# Patient Record
Sex: MALE | Race: ASIAN | NOT HISPANIC OR LATINO | Employment: OTHER
[De-identification: names, ages, dates, MRNs, and addresses within clinical notes are randomized per-mention and may not be internally consistent; named-entity substitution may affect disease eponyms.]

---

## 2023-01-03 LAB — HBA1C MFR BLD: 5.6 % (ref ?–5.8)

## 2023-03-26 SDOH — HEALTH STABILITY: PHYSICAL HEALTH
ON AVERAGE, HOW MANY DAYS PER WEEK DO YOU ENGAGE IN MODERATE TO STRENUOUS EXERCISE (LIKE A BRISK WALK)?: PATIENT DECLINED

## 2023-03-26 SDOH — ECONOMIC STABILITY: TRANSPORTATION INSECURITY
IN THE PAST 12 MONTHS, HAS LACK OF TRANSPORTATION KEPT YOU FROM MEETINGS, WORK, OR FROM GETTING THINGS NEEDED FOR DAILY LIVING?: PATIENT DECLINED

## 2023-03-26 SDOH — ECONOMIC STABILITY: INCOME INSECURITY: HOW HARD IS IT FOR YOU TO PAY FOR THE VERY BASICS LIKE FOOD, HOUSING, MEDICAL CARE, AND HEATING?: PATIENT DECLINED

## 2023-03-26 SDOH — ECONOMIC STABILITY: HOUSING INSECURITY
IN THE LAST 12 MONTHS, WAS THERE A TIME WHEN YOU DID NOT HAVE A STEADY PLACE TO SLEEP OR SLEPT IN A SHELTER (INCLUDING NOW)?: PATIENT REFUSED

## 2023-03-26 SDOH — ECONOMIC STABILITY: FOOD INSECURITY: WITHIN THE PAST 12 MONTHS, YOU WORRIED THAT YOUR FOOD WOULD RUN OUT BEFORE YOU GOT MONEY TO BUY MORE.: PATIENT DECLINED

## 2023-03-26 SDOH — HEALTH STABILITY: PHYSICAL HEALTH: ON AVERAGE, HOW MANY MINUTES DO YOU ENGAGE IN EXERCISE AT THIS LEVEL?: PATIENT DECLINED

## 2023-03-26 SDOH — ECONOMIC STABILITY: TRANSPORTATION INSECURITY
IN THE PAST 12 MONTHS, HAS LACK OF RELIABLE TRANSPORTATION KEPT YOU FROM MEDICAL APPOINTMENTS, MEETINGS, WORK OR FROM GETTING THINGS NEEDED FOR DAILY LIVING?: PATIENT DECLINED

## 2023-03-26 SDOH — ECONOMIC STABILITY: TRANSPORTATION INSECURITY
IN THE PAST 12 MONTHS, HAS THE LACK OF TRANSPORTATION KEPT YOU FROM MEDICAL APPOINTMENTS OR FROM GETTING MEDICATIONS?: PATIENT DECLINED

## 2023-03-26 SDOH — ECONOMIC STABILITY: INCOME INSECURITY: IN THE LAST 12 MONTHS, WAS THERE A TIME WHEN YOU WERE NOT ABLE TO PAY THE MORTGAGE OR RENT ON TIME?: PATIENT REFUSED

## 2023-03-26 SDOH — ECONOMIC STABILITY: HOUSING INSECURITY: IN THE LAST 12 MONTHS, HOW MANY PLACES HAVE YOU LIVED?: 0

## 2023-03-26 SDOH — ECONOMIC STABILITY: FOOD INSECURITY: WITHIN THE PAST 12 MONTHS, THE FOOD YOU BOUGHT JUST DIDN'T LAST AND YOU DIDN'T HAVE MONEY TO GET MORE.: PATIENT DECLINED

## 2023-03-26 SDOH — HEALTH STABILITY: MENTAL HEALTH
STRESS IS WHEN SOMEONE FEELS TENSE, NERVOUS, ANXIOUS, OR CAN'T SLEEP AT NIGHT BECAUSE THEIR MIND IS TROUBLED. HOW STRESSED ARE YOU?: PATIENT DECLINED

## 2023-03-26 ASSESSMENT — SOCIAL DETERMINANTS OF HEALTH (SDOH)
IN A TYPICAL WEEK, HOW MANY TIMES DO YOU TALK ON THE PHONE WITH FAMILY, FRIENDS, OR NEIGHBORS?: PATIENT DECLINED
DO YOU BELONG TO ANY CLUBS OR ORGANIZATIONS SUCH AS CHURCH GROUPS UNIONS, FRATERNAL OR ATHLETIC GROUPS, OR SCHOOL GROUPS?: PATIENT DECLINED
HOW OFTEN DO YOU HAVE SIX OR MORE DRINKS ON ONE OCCASION: PATIENT DECLINED
HOW OFTEN DO YOU ATTEND CHURCH OR RELIGIOUS SERVICES?: PATIENT DECLINED
DO YOU BELONG TO ANY CLUBS OR ORGANIZATIONS SUCH AS CHURCH GROUPS UNIONS, FRATERNAL OR ATHLETIC GROUPS, OR SCHOOL GROUPS?: PATIENT DECLINED
HOW OFTEN DO YOU ATTENT MEETINGS OF THE CLUB OR ORGANIZATION YOU BELONG TO?: PATIENT DECLINED
HOW OFTEN DO YOU GET TOGETHER WITH FRIENDS OR RELATIVES?: PATIENT DECLINED
ARE YOU MARRIED, WIDOWED, DIVORCED, SEPARATED, NEVER MARRIED, OR LIVING WITH A PARTNER?: PATIENT DECLINED
ARE YOU MARRIED, WIDOWED, DIVORCED, SEPARATED, NEVER MARRIED, OR LIVING WITH A PARTNER?: PATIENT DECLINED
HOW OFTEN DO YOU HAVE A DRINK CONTAINING ALCOHOL: 2-3 TIMES A WEEK
HOW OFTEN DO YOU ATTEND CHURCH OR RELIGIOUS SERVICES?: PATIENT DECLINED
WITHIN THE PAST 12 MONTHS, YOU WORRIED THAT YOUR FOOD WOULD RUN OUT BEFORE YOU GOT THE MONEY TO BUY MORE: PATIENT DECLINED
HOW MANY DRINKS CONTAINING ALCOHOL DO YOU HAVE ON A TYPICAL DAY WHEN YOU ARE DRINKING: 3 OR 4
HOW HARD IS IT FOR YOU TO PAY FOR THE VERY BASICS LIKE FOOD, HOUSING, MEDICAL CARE, AND HEATING?: PATIENT DECLINED
IN A TYPICAL WEEK, HOW MANY TIMES DO YOU TALK ON THE PHONE WITH FAMILY, FRIENDS, OR NEIGHBORS?: PATIENT DECLINED
HOW OFTEN DO YOU GET TOGETHER WITH FRIENDS OR RELATIVES?: PATIENT DECLINED
HOW OFTEN DO YOU ATTENT MEETINGS OF THE CLUB OR ORGANIZATION YOU BELONG TO?: PATIENT DECLINED

## 2023-03-26 ASSESSMENT — LIFESTYLE VARIABLES
SKIP TO QUESTIONS 9-10: 0
HOW OFTEN DO YOU HAVE SIX OR MORE DRINKS ON ONE OCCASION: PATIENT DECLINED
HOW MANY STANDARD DRINKS CONTAINING ALCOHOL DO YOU HAVE ON A TYPICAL DAY: 3 OR 4
AUDIT-C TOTAL SCORE: -1
HOW OFTEN DO YOU HAVE A DRINK CONTAINING ALCOHOL: 2-3 TIMES A WEEK

## 2023-03-28 ENCOUNTER — OFFICE VISIT (OUTPATIENT)
Dept: INTERNAL MEDICINE | Facility: OTHER | Age: 63
End: 2023-03-28
Payer: OTHER GOVERNMENT

## 2023-03-28 VITALS
DIASTOLIC BLOOD PRESSURE: 67 MMHG | TEMPERATURE: 97.1 F | OXYGEN SATURATION: 98 % | SYSTOLIC BLOOD PRESSURE: 111 MMHG | BODY MASS INDEX: 24.16 KG/M2 | HEIGHT: 65 IN | HEART RATE: 71 BPM | WEIGHT: 145 LBS

## 2023-03-28 DIAGNOSIS — F17.210 CIGARETTE NICOTINE DEPENDENCE WITHOUT COMPLICATION: ICD-10-CM

## 2023-03-28 DIAGNOSIS — Z12.2 ENCOUNTER FOR SCREENING FOR LUNG CANCER: ICD-10-CM

## 2023-03-28 DIAGNOSIS — Z23 ENCOUNTER FOR IMMUNIZATION: ICD-10-CM

## 2023-03-28 DIAGNOSIS — Z12.12 SCREENING FOR COLORECTAL CANCER: ICD-10-CM

## 2023-03-28 DIAGNOSIS — Z12.11 SCREENING FOR COLORECTAL CANCER: ICD-10-CM

## 2023-03-28 DIAGNOSIS — I10 ESSENTIAL HYPERTENSION: ICD-10-CM

## 2023-03-28 PROCEDURE — 90472 IMMUNIZATION ADMIN EACH ADD: CPT | Performed by: GENERAL PRACTICE

## 2023-03-28 PROCEDURE — 90715 TDAP VACCINE 7 YRS/> IM: CPT | Performed by: GENERAL PRACTICE

## 2023-03-28 PROCEDURE — 90471 IMMUNIZATION ADMIN: CPT | Performed by: GENERAL PRACTICE

## 2023-03-28 PROCEDURE — 90677 PCV20 VACCINE IM: CPT | Performed by: GENERAL PRACTICE

## 2023-03-28 PROCEDURE — 99204 OFFICE O/P NEW MOD 45 MIN: CPT | Mod: 25,GC | Performed by: INTERNAL MEDICINE

## 2023-03-28 RX ORDER — LISINOPRIL 40 MG/1
40 TABLET ORAL DAILY
COMMUNITY
End: 2023-03-28 | Stop reason: SDUPTHER

## 2023-03-28 RX ORDER — LISINOPRIL 40 MG/1
40 TABLET ORAL DAILY
Qty: 90 TABLET | Refills: 3 | Status: SHIPPED | OUTPATIENT
Start: 2023-03-28 | End: 2023-06-24 | Stop reason: SDUPTHER

## 2023-03-28 ASSESSMENT — ENCOUNTER SYMPTOMS
DIZZINESS: 0
MYALGIAS: 0
DIARRHEA: 0
WHEEZING: 0
COUGH: 0
SHORTNESS OF BREATH: 0
FEVER: 0
NERVOUS/ANXIOUS: 0
HEARTBURN: 0
BLURRED VISION: 0
ABDOMINAL PAIN: 0
DEPRESSION: 0
DOUBLE VISION: 0
FALLS: 0
NAUSEA: 0
CONSTIPATION: 0
WEIGHT LOSS: 0
HEADACHES: 0
SORE THROAT: 0
WEAKNESS: 0
CHILLS: 0
VOMITING: 0
PALPITATIONS: 0

## 2023-03-28 ASSESSMENT — PATIENT HEALTH QUESTIONNAIRE - PHQ9: CLINICAL INTERPRETATION OF PHQ2 SCORE: 0

## 2023-03-28 ASSESSMENT — LIFESTYLE VARIABLES: SUBSTANCE_ABUSE: 0

## 2023-03-28 NOTE — PROGRESS NOTES
New Patient to Establish    Reason to establish: New patient to establish    CC: new    HPI: 62-year-old male retired [employer]  of 27 years, presents to establish care. Retired from the [employer].    HTN: controleld at home on Lisinopril 40mg daily without issue.    No h/o CVA, heart disease, MI, PVD.    Had labs done:  T.c hol 137, Trig 72, Hdl 69, Hdl 50 in January 2023  CMP unremarkable January 2023.   A1c 5.6% and UA, CBC unremarkable January 2023.   TSH/FT4, PSA wnl January 2023.   Testesterone 410, Folic acid/B12/Vitamin D wnl.     Smokes 3/4 PPD for 30 years. Not ready to quit yet.      Screened for HIV, Hep C in the past and negative.    Colonoscopy 2012: normal    Patient Active Problem List    Diagnosis Date Noted    Essential hypertension 03/28/2023       Past Medical History:   Diagnosis Date    Hypertension     Smoker        Current Outpatient Medications   Medication Sig Dispense Refill    lisinopril (PRINIVIL) 40 MG tablet Take 1 Tablet by mouth every day for 90 days. 90 Tablet 3    aspirin EC (ECOTRIN) 81 MG Tablet Delayed Response        No current facility-administered medications for this visit.       Allergies as of 03/28/2023    (No Known Allergies)       Social History     Tobacco Use    Smoking status: Every Day     Packs/day: 0.50     Types: Cigarettes    Smokeless tobacco: Never    Tobacco comments:     Patient smokes 3 cigarettes daily   Vaping Use    Vaping Use: Never used   Substance Use Topics    Alcohol use: Yes     Comment: 10 beers weekly    Drug use: Never       Family History   Problem Relation Age of Onset    Heart Disease Mother     Hypertension Father        Past Surgical History:   Procedure Laterality Date    APPENDECTOMY      CHOLECYSTECTOMY      COMMON BILE DUCT EXPLORATION      LITHOTRIPSY           Review of Systems   Constitutional:  Negative for chills, fever, malaise/fatigue and weight loss.   HENT:  Negative for congestion and sore throat.    Eyes:   "Negative for blurred vision and double vision.   Respiratory:  Negative for cough, shortness of breath and wheezing.    Cardiovascular:  Negative for chest pain and palpitations.   Gastrointestinal:  Negative for abdominal pain, constipation, diarrhea, heartburn, nausea and vomiting.   Genitourinary:  Negative for dysuria, frequency and urgency.   Musculoskeletal:  Negative for falls, joint pain and myalgias.   Skin:  Negative for rash.   Neurological:  Negative for dizziness, weakness and headaches.   Psychiatric/Behavioral:  Negative for depression, substance abuse and suicidal ideas. The patient is not nervous/anxious.        /67 (BP Location: Right arm, Patient Position: Sitting, BP Cuff Size: Adult)   Pulse 71   Temp 36.2 °C (97.1 °F) (Temporal)   Ht 1.651 m (5' 5\")   Wt 65.8 kg (145 lb)   SpO2 98%   BMI 24.13 kg/m²       Physical Exam  Physical Exam  Vitals and nursing note reviewed.   Constitutional:       General: He is not in acute distress.     Appearance: Normal appearance. He is not ill-appearing.   HENT:      Head: Normocephalic and atraumatic.   Eyes:      Conjunctiva/sclera: Conjunctivae normal.   Cardiovascular:      Rate and Rhythm: Normal rate and regular rhythm.      Pulses: Normal pulses.      Heart sounds: Normal heart sounds. No murmur heard.    No friction rub. No gallop.   Pulmonary:      Effort: Pulmonary effort is normal.      Breath sounds: Normal breath sounds.   Musculoskeletal:      Cervical back: No tenderness.   Neurological:      General: No focal deficit present.      Mental Status: He is alert and oriented to person, place, and time.   Psychiatric:         Mood and Affect: Mood normal.         Behavior: Behavior normal.       Note: I have reviewed all pertinent labs and diagnostic tests associated with this visit with specific comments listed under the assessment and plan below    Assessment and Plan      1. Essential hypertension  Controlled. Continue Lisinopril as " prescribed  - lisinopril (PRINIVIL) 40 MG tablet; Take 1 Tablet by mouth every day for 90 days.  Dispense: 90 Tablet; Refill: 3    2. Cigarette nicotine dependence without complication  Patient smokes 3/4 PPD for 30 years.  Nicotine patch and/or gum.  I discussed cessation with patient including starting on nicotine patch and/or gum on discharge.  I also discussed medications to help with cessation with patient including Wellbutrin and Chantix, nicotine patches .  Smoking cessation discussed with patient for 4 minutes.Previous attempts to quit: none. Not ready to quit.will follow up in 7 weeks to discuss again.      3. Screening for colorectal cancer  - Referral to GI for Colonoscopy    4. Encounter for immunization  - Pneumococcal Conjugate Vaccine 20-Valent (19 yrs+)  - Tdap Vaccine =>6YO IM    5. Encounter for screening for lung cancer  - REFERRAL TO LUNG CANCER SCREENING PROGRAM          Followup: No follow-ups on file.        Signed by: Pritesh Boyd Jr., M.D.

## 2023-03-28 NOTE — PATIENT INSTRUCTIONS
-labs to do  -referral for Colonoscopy: call [phone] to schedule  -get your COVID-19 booster and Shingles vaccine, Shingrix  -referral for lung cancer screening: you will be called  -stop Aspirin since no indication to take  -follow up 7 weeks

## 2023-03-29 ENCOUNTER — TELEPHONE (OUTPATIENT)
Dept: HEMATOLOGY ONCOLOGY | Facility: MEDICAL CENTER | Age: 63
End: 2023-03-29
Payer: OTHER GOVERNMENT

## 2023-03-29 NOTE — TELEPHONE ENCOUNTER
Received referral to lung cancer screening program.  Chart review to assess for lung cancer screening program eligibility.   1. Age 50-80 yrs of age? Yes 62 y.o.  2. 20 pack year hx of smoking, or greater? Yes 0.75 [id number]= [id number] hx  3. Current smoker or if quit, has pt quit within last 15 yrs?Yes   Current smoker  4. Any signs or symptoms of lung cancer? None noted  5. Previous history of lung cancer? None noted  6. Chest CT within past 12 mos.? None noted  Patient does meet eligibility criteria. [facility] scheduling notified to schedule the shared decision making visit.

## 2023-04-06 ENCOUNTER — TELEPHONE (OUTPATIENT)
Dept: HEMATOLOGY ONCOLOGY | Facility: MEDICAL CENTER | Age: 63
End: 2023-04-06
Payer: OTHER GOVERNMENT

## 2023-04-06 NOTE — TELEPHONE ENCOUNTER
Thank you for your referral to the Lung cancer screening program. Following review we have found that your patient's insurance DOES NOT cover their Lung Cancer Screening Program Shared Decision Making Appointment. Alternative imaging exams could be considered.

## 2023-05-17 ENCOUNTER — OFFICE VISIT (OUTPATIENT)
Dept: INTERNAL MEDICINE | Facility: OTHER | Age: 63
End: 2023-05-17
Payer: OTHER GOVERNMENT

## 2023-05-17 VITALS
TEMPERATURE: 97.6 F | BODY MASS INDEX: 24.79 KG/M2 | HEART RATE: 69 BPM | WEIGHT: 148.8 LBS | HEIGHT: 65 IN | SYSTOLIC BLOOD PRESSURE: 127 MMHG | OXYGEN SATURATION: 98 % | DIASTOLIC BLOOD PRESSURE: 75 MMHG

## 2023-05-17 DIAGNOSIS — F17.210 CIGARETTE NICOTINE DEPENDENCE WITHOUT COMPLICATION: ICD-10-CM

## 2023-05-17 DIAGNOSIS — I10 ESSENTIAL HYPERTENSION: ICD-10-CM

## 2023-05-17 PROCEDURE — 99214 OFFICE O/P EST MOD 30 MIN: CPT | Mod: GC | Performed by: GENERAL PRACTICE

## 2023-05-17 PROCEDURE — 3078F DIAST BP <80 MM HG: CPT | Performed by: GENERAL PRACTICE

## 2023-05-17 PROCEDURE — 3074F SYST BP LT 130 MM HG: CPT | Performed by: GENERAL PRACTICE

## 2023-05-17 ASSESSMENT — ENCOUNTER SYMPTOMS
WHEEZING: 0
DIARRHEA: 0
HEARTBURN: 0
FALLS: 0
DOUBLE VISION: 0
SHORTNESS OF BREATH: 0
VOMITING: 0
COUGH: 0
NERVOUS/ANXIOUS: 0
WEIGHT LOSS: 0
ABDOMINAL PAIN: 0
SORE THROAT: 0
MYALGIAS: 0
DIZZINESS: 0
NAUSEA: 0
FEVER: 0
DEPRESSION: 0
CHILLS: 0
CONSTIPATION: 0
HEADACHES: 0
WEAKNESS: 0
PALPITATIONS: 0
BLURRED VISION: 0

## 2023-05-17 ASSESSMENT — LIFESTYLE VARIABLES: SUBSTANCE_ABUSE: 0

## 2023-05-17 NOTE — PATIENT INSTRUCTIONS
-get  labs done  1 week prior to next appointment  -continue lisinopril  -check blood pressure daily and record in log and bring to your next appointment.  -stop smoking; if want to quit, call the clinic and can help with medication. Call 1-800 QUIT NOW as well to assist.  -will set you up with new resident on next appointment

## 2023-05-17 NOTE — PROGRESS NOTES
Established Patient    Carlos Alberto presents today with the following:    CC: established    HPI: 60-year-old male presents for routine appointment.    Hypertension; controlled with lisinopril without issue at home.    Smoker; smokes three quarters a pack per day for 30 years.  Not ready to quit.    Outside lab results: normal Vitamin D/lipid panel; CMP/folic acid, B12, PSA, testosterone, UA, CBC, TSH unremarkable.     Patient Active Problem List    Diagnosis Date Noted    Essential hypertension 03/28/2023    Dependence on nicotine from cigarettes 03/28/2023       Social History     Tobacco Use    Smoking status: Every Day     Packs/day: 0.50     Types: Cigarettes    Smokeless tobacco: Never    Tobacco comments:     Patient smokes 3 cigarettes daily   Vaping Use    Vaping Use: Never used   Substance Use Topics    Alcohol use: Yes     Comment: 10 beers weekly    Drug use: Never       Current Outpatient Medications   Medication Sig Dispense Refill    lisinopril (PRINIVIL) 40 MG tablet Take 1 Tablet by mouth every day for 90 days. 90 Tablet 3     No current facility-administered medications for this visit.       Review of Systems   Constitutional:  Negative for chills, fever, malaise/fatigue and weight loss.   HENT:  Negative for congestion and sore throat.    Eyes:  Negative for blurred vision and double vision.   Respiratory:  Negative for cough, shortness of breath and wheezing.    Cardiovascular:  Negative for chest pain and palpitations.   Gastrointestinal:  Negative for abdominal pain, constipation, diarrhea, heartburn, nausea and vomiting.   Genitourinary:  Negative for dysuria, frequency and urgency.   Musculoskeletal:  Negative for falls, joint pain and myalgias.   Skin:  Negative for rash.   Neurological:  Negative for dizziness, weakness and headaches.   Psychiatric/Behavioral:  Negative for depression, substance abuse and suicidal ideas. The patient is not nervous/anxious.          /75 (BP Location:  "Right arm, Patient Position: Sitting, BP Cuff Size: Adult)   Pulse 69   Temp 36.4 °C (97.6 °F) (Temporal)   Ht 1.651 m (5' 5\")   Wt 67.5 kg (148 lb 12.8 oz)   SpO2 98%   BMI 24.76 kg/m²       PHYSICAL EXAM:  Physical Exam  Vitals reviewed.   Constitutional:       General: He is not in acute distress.     Appearance: He is not ill-appearing.   HENT:      Head: Normocephalic and atraumatic.   Neurological:      General: No focal deficit present.      Mental Status: He is alert and oriented to person, place, and time.   Psychiatric:         Mood and Affect: Mood normal.         Behavior: Behavior normal.         Note: I have reviewed all pertinent labs and diagnostic tests associated with this visit with specific comments listed under the assessment and plan below    Assessment and Plan     1. Essential hypertension  Controlled and continue lisinopril as prescribed.  Patient instructed to get a metabolic panel completed approximately 1 week prior to next appointment.  - Basic Metabolic Panel; Future    2. Cigarette nicotine dependence without complication  Patient smokes 3/4 PPD for 30 years.    I discussed cessation with patient including starting on nicotine patch and/or gum on discharge.  I also discussed medications to help with cessation with patient including Wellbutrin and Chantix, patches .  Smoking cessation discussed with patient for 4 minutes.The patient is not ready to quit.  Patient instructed to call the clinic or notify us if he is interested in pharmacological intervention and to call [phone] quit now.  Recommend follow-up for reevaluation when seen by new resident.    Followup: Return in about 6 months (around 11/17/2023), or schedule with new PGY-1.      Signed by: Pritesh Boyd Jr., M.D.    "

## 2023-09-01 DIAGNOSIS — I10 ESSENTIAL HYPERTENSION: ICD-10-CM

## 2023-09-01 RX ORDER — LISINOPRIL 40 MG/1
40 TABLET ORAL DAILY
Qty: 90 TABLET | Refills: 1 | Status: SHIPPED | OUTPATIENT
Start: 2023-09-01 | End: 2023-09-06 | Stop reason: SDUPTHER

## 2023-09-06 DIAGNOSIS — I10 ESSENTIAL HYPERTENSION: ICD-10-CM

## 2023-09-06 RX ORDER — LISINOPRIL 40 MG/1
40 TABLET ORAL DAILY
Qty: 90 TABLET | Refills: 1 | Status: SHIPPED | OUTPATIENT
Start: 2023-09-06

## 2023-11-17 ENCOUNTER — HOSPITAL ENCOUNTER (OUTPATIENT)
Dept: LAB | Facility: MEDICAL CENTER | Age: 63
End: 2023-11-17
Attending: GENERAL PRACTICE
Payer: OTHER GOVERNMENT

## 2023-11-17 DIAGNOSIS — I10 ESSENTIAL HYPERTENSION: ICD-10-CM

## 2023-11-17 LAB
ANION GAP SERPL CALC-SCNC: 9 MMOL/L (ref 7–16)
BUN SERPL-MCNC: 12 MG/DL (ref 8–22)
CALCIUM SERPL-MCNC: 9.5 MG/DL (ref 8.5–10.5)
CHLORIDE SERPL-SCNC: 104 MMOL/L (ref 96–112)
CO2 SERPL-SCNC: 25 MMOL/L (ref 20–33)
CREAT SERPL-MCNC: 0.86 MG/DL (ref 0.5–1.4)
GFR SERPLBLD CREATININE-BSD FMLA CKD-EPI: 97 ML/MIN/1.73 M 2
GLUCOSE SERPL-MCNC: 112 MG/DL (ref 65–99)
POTASSIUM SERPL-SCNC: 4 MMOL/L (ref 3.6–5.5)
SODIUM SERPL-SCNC: 138 MMOL/L (ref 135–145)

## 2023-11-17 PROCEDURE — 36415 COLL VENOUS BLD VENIPUNCTURE: CPT

## 2023-11-17 PROCEDURE — 80048 BASIC METABOLIC PNL TOTAL CA: CPT

## 2023-11-20 ENCOUNTER — OFFICE VISIT (OUTPATIENT)
Dept: INTERNAL MEDICINE | Facility: OTHER | Age: 63
End: 2023-11-20
Payer: OTHER GOVERNMENT

## 2023-11-20 VITALS
HEIGHT: 65 IN | WEIGHT: 154.8 LBS | TEMPERATURE: 97.6 F | SYSTOLIC BLOOD PRESSURE: 130 MMHG | HEART RATE: 67 BPM | OXYGEN SATURATION: 98 % | DIASTOLIC BLOOD PRESSURE: 78 MMHG | BODY MASS INDEX: 25.79 KG/M2

## 2023-11-20 DIAGNOSIS — Z12.11 SCREENING FOR COLORECTAL CANCER: ICD-10-CM

## 2023-11-20 DIAGNOSIS — Z12.12 SCREENING FOR COLORECTAL CANCER: ICD-10-CM

## 2023-11-20 DIAGNOSIS — R35.0 INCREASED URINARY FREQUENCY: ICD-10-CM

## 2023-11-20 DIAGNOSIS — I10 ESSENTIAL HYPERTENSION: ICD-10-CM

## 2023-11-20 DIAGNOSIS — Z00.00 ENCOUNTER FOR PREVENTIVE HEALTH EXAMINATION: ICD-10-CM

## 2023-11-20 PROBLEM — F10.10 ALCOHOL ABUSE: Status: ACTIVE | Noted: 2023-11-20

## 2023-11-20 PROBLEM — M75.20 BICEPS TENDINITIS: Status: ACTIVE | Noted: 2023-11-20

## 2023-11-20 PROBLEM — H81.10 BENIGN PAROXYSMAL POSITIONAL VERTIGO: Status: ACTIVE | Noted: 2023-11-20

## 2023-11-20 PROBLEM — H52.4 PRESBYOPIA: Status: ACTIVE | Noted: 2023-11-20

## 2023-11-20 PROBLEM — H18.419 ARCUS SENILIS: Status: ACTIVE | Noted: 2023-11-20

## 2023-11-20 PROBLEM — H52.229 REGULAR ASTIGMATISM: Status: ACTIVE | Noted: 2023-11-20

## 2023-11-20 PROBLEM — G47.00 INSOMNIA: Status: ACTIVE | Noted: 2023-11-20

## 2023-11-20 PROBLEM — F17.200 NICOTINE DEPENDENCE: Status: ACTIVE | Noted: 2023-03-28

## 2023-11-20 PROBLEM — H52.00 HYPEROPIA: Status: ACTIVE | Noted: 2023-11-20

## 2023-11-20 PROCEDURE — 1126F AMNT PAIN NOTED NONE PRSNT: CPT | Mod: GC | Performed by: HOSPITALIST

## 2023-11-20 PROCEDURE — 3078F DIAST BP <80 MM HG: CPT | Mod: GC | Performed by: HOSPITALIST

## 2023-11-20 PROCEDURE — 99214 OFFICE O/P EST MOD 30 MIN: CPT | Mod: GC | Performed by: HOSPITALIST

## 2023-11-20 PROCEDURE — 3075F SYST BP GE 130 - 139MM HG: CPT | Mod: GC | Performed by: HOSPITALIST

## 2023-11-20 ASSESSMENT — PAIN SCALES - GENERAL: PAINLEVEL: NO PAIN

## 2023-11-20 NOTE — PROGRESS NOTES
"Subjective     Carlos Alberto Gary is a 63 y.o. male who presents with Hypertension and Follow-Up            HPI  1. Essential hypertension  Checks BP at home, SBP's in the 130's and 140's in the morning or at night  Reports compliance with lisinopril 40mg daily in the afternoon  Diet: fish, vegetables, rice, chicken, and soy sauce  Exercise: walks 50-60 minutes at least daily  Hey is willing to take his blood pressure in the afternoon after taking his blood pressure     2. Increased urinary frequency  Concerned about his prostate and increased urination  Increased urination of 1 year duration  Denies difficulty with urination initiation and hematuria  Reports increased urination during the day and increased nocturnal urination 2-5 times a night  Reports burning with urination    3. Screening for colorectal cancer  Received a colonoscopy at age 55 and again he says in 2017 or 2012; he reports that the one he received at age 55 and there after was normal.  He reports he would like to do another one to \"healthy\" and because he is not sure if his last colonoscopy was om 2012 or 2015      4. Encounter for preventive health examination  Continues to smoke and has 2-3 alcohol beverages on occasion  Smokes 1-3 cigarettes daily     ROS  Review of systems as described in HPI.           Objective     /78 (BP Location: Right arm, Patient Position: Sitting, BP Cuff Size: Adult long) Comment: repeated bp  5 mins  Pulse 67   Temp 36.4 °C (97.6 °F) (Temporal)   Ht 1.651 m (5' 5\")   Wt 70.2 kg (154 lb 12.8 oz)   SpO2 98%   BMI 25.76 kg/m²      Physical Exam  Constitutional:       General: He is not in acute distress.     Appearance: He is not ill-appearing or toxic-appearing.   HENT:      Head: Normocephalic and atraumatic.      Nose: No rhinorrhea.   Eyes:      General: No scleral icterus.        Right eye: No discharge.         Left eye: No discharge.      Extraocular Movements: Extraocular movements intact.   Neck:      " Comments: Right upper neck lipoma  Cardiovascular:      Rate and Rhythm: Normal rate.      Pulses: Normal pulses.   Pulmonary:      Effort: Pulmonary effort is normal. No respiratory distress.      Breath sounds: No wheezing.   Abdominal:      General: There is no distension.      Tenderness: There is no guarding.   Musculoskeletal:         General: Normal range of motion.      Cervical back: Normal range of motion. No rigidity.   Skin:     Coloration: Skin is not jaundiced.   Neurological:      General: No focal deficit present.      Gait: Gait normal.   Psychiatric:         Mood and Affect: Mood normal.               Assessment & Plan        1. Essential hypertension  -Cutout soy sauce use or use low sodium soy sauce  -Take blood pressure medication prior to checking blood pressure  -Continue to check blood pressure daily  Home blood pressure monitoring:  - check your blood pressure every day and put it in a log  - pick a different time each day so we can see how it varies throughout the day  - when you check your blood pressure: make sure you sit quietly for 5-10 minutes beforehand, keep both feet flat on the ground, and make sure you use an arm cuff at heart height    Lifestyle factors to help manage blood pressure:  1) reduce stress with daily activity, consider yoga, breathing exercises (like 4-7-8 breathing technique)   2) reduce sodium to <2000 mg/day  3) DASH diet     4) 200-300 min/week cardio, which you can build up to.     - Comp Metabolic Panel; Future  - CBC WITH DIFFERENTIAL; Future  - Lipid Profile; Future    2. Increased urinary frequency  Possibly secondary to urinary tract infection considering burning versus BPH or a combination of the two. Will order lab work and then further evaluate.   - PROSTATE SPECIFIC AG SCREENING; Future  - URINALYSIS; Future  - RPR (SYPHILIS); Future  - Chlamydia/GC, PCR (Urine); Future  - URINALYSIS,CULTURE IF INDICATED; Future    3. Screening for colorectal  cancer  Unsure of the last colonoscopy but reports the results were normal. Likely due to repeat.  - Referral to GI for Colonoscopy    4. Encounter for preventive health examination  Tobacco cessation counseling was given as patient has a history of tobacco dependence.  Counseling involved discussing the harmful cardiovascular effects including accelerated atherosclerosis, risk factor for stroke, coronary artery disease and heart attack.    -After discussion, patient is not willing to quit smoking.  - PROSTATE SPECIFIC AG SCREENING; Future  - URINALYSIS; Future  - RPR (SYPHILIS); Future  - Chlamydia/GC, PCR (Urine); Future  - HIV AG/AB COMBO ASSAY SCREENING; Future        Salima Messina MD MPH  PGY-3  [facility]

## 2024-01-03 ENCOUNTER — TELEPHONE (OUTPATIENT)
Dept: INTERNAL MEDICINE | Facility: OTHER | Age: 64
End: 2024-01-03
Payer: OTHER GOVERNMENT

## 2024-01-03 NOTE — TELEPHONE ENCOUNTER
Patient LVM, would like medication refill did not say what medication. Called patient and was unable to leave vm due to not being set up.

## 2024-01-03 NOTE — LETTER
Delfina Carcamo,   We received a voicemail a couple of days ago, attempted to call you back and failed at reaching you. There is no voicemail set up so we were unable to leave voicemail as well. Please call out [facility] @[phone].

## 2024-02-12 ENCOUNTER — HOSPITAL ENCOUNTER (OUTPATIENT)
Dept: LAB | Facility: MEDICAL CENTER | Age: 64
End: 2024-02-12
Attending: HOSPITALIST
Payer: OTHER GOVERNMENT

## 2024-02-12 DIAGNOSIS — I10 ESSENTIAL HYPERTENSION: ICD-10-CM

## 2024-02-12 DIAGNOSIS — R35.0 INCREASED URINARY FREQUENCY: ICD-10-CM

## 2024-02-12 DIAGNOSIS — Z00.00 ENCOUNTER FOR PREVENTIVE HEALTH EXAMINATION: ICD-10-CM

## 2024-02-12 LAB
ALBUMIN SERPL BCP-MCNC: 4 G/DL (ref 3.2–4.9)
ALBUMIN/GLOB SERPL: 1.3 G/DL
ALP SERPL-CCNC: 80 U/L (ref 30–99)
ALT SERPL-CCNC: 70 U/L (ref 2–50)
ANION GAP SERPL CALC-SCNC: 10 MMOL/L (ref 7–16)
APPEARANCE UR: CLEAR
AST SERPL-CCNC: 33 U/L (ref 12–45)
BASOPHILS # BLD AUTO: 1.3 % (ref 0–1.8)
BASOPHILS # BLD: 0.07 K/UL (ref 0–0.12)
BILIRUB SERPL-MCNC: 0.5 MG/DL (ref 0.1–1.5)
BILIRUB UR QL STRIP.AUTO: NEGATIVE
BUN SERPL-MCNC: 11 MG/DL (ref 8–22)
C TRACH DNA SPEC QL NAA+PROBE: NEGATIVE
CALCIUM ALBUM COR SERPL-MCNC: 8.8 MG/DL (ref 8.5–10.5)
CALCIUM SERPL-MCNC: 8.8 MG/DL (ref 8.5–10.5)
CHLORIDE SERPL-SCNC: 107 MMOL/L (ref 96–112)
CHOLEST SERPL-MCNC: 172 MG/DL (ref 100–199)
CO2 SERPL-SCNC: 25 MMOL/L (ref 20–33)
COLOR UR: YELLOW
CREAT SERPL-MCNC: 0.92 MG/DL (ref 0.5–1.4)
EOSINOPHIL # BLD AUTO: 0.27 K/UL (ref 0–0.51)
EOSINOPHIL NFR BLD: 5.1 % (ref 0–6.9)
ERYTHROCYTE [DISTWIDTH] IN BLOOD BY AUTOMATED COUNT: 41.9 FL (ref 35.9–50)
GFR SERPLBLD CREATININE-BSD FMLA CKD-EPI: 93 ML/MIN/1.73 M 2
GLOBULIN SER CALC-MCNC: 3.1 G/DL (ref 1.9–3.5)
GLUCOSE SERPL-MCNC: 104 MG/DL (ref 65–99)
GLUCOSE UR STRIP.AUTO-MCNC: NEGATIVE MG/DL
HCT VFR BLD AUTO: 47.7 % (ref 42–52)
HDLC SERPL-MCNC: 52 MG/DL
HGB BLD-MCNC: 17 G/DL (ref 14–18)
HIV 1+2 AB+HIV1 P24 AG SERPL QL IA: NORMAL
IMM GRANULOCYTES # BLD AUTO: 0.03 K/UL (ref 0–0.11)
IMM GRANULOCYTES NFR BLD AUTO: 0.6 % (ref 0–0.9)
KETONES UR STRIP.AUTO-MCNC: NEGATIVE MG/DL
LDLC SERPL CALC-MCNC: 94 MG/DL
LEUKOCYTE ESTERASE UR QL STRIP.AUTO: NEGATIVE
LYMPHOCYTES # BLD AUTO: 1.93 K/UL (ref 1–4.8)
LYMPHOCYTES NFR BLD: 36.6 % (ref 22–41)
MCH RBC QN AUTO: 34.3 PG (ref 27–33)
MCHC RBC AUTO-ENTMCNC: 35.6 G/DL (ref 32.3–36.5)
MCV RBC AUTO: 96.4 FL (ref 81.4–97.8)
MICRO URNS: NORMAL
MONOCYTES # BLD AUTO: 0.62 K/UL (ref 0–0.85)
MONOCYTES NFR BLD AUTO: 11.7 % (ref 0–13.4)
N GONORRHOEA DNA SPEC QL NAA+PROBE: NEGATIVE
NEUTROPHILS # BLD AUTO: 2.36 K/UL (ref 1.82–7.42)
NEUTROPHILS NFR BLD: 44.7 % (ref 44–72)
NITRITE UR QL STRIP.AUTO: NEGATIVE
NRBC # BLD AUTO: 0 K/UL
NRBC BLD-RTO: 0 /100 WBC (ref 0–0.2)
PH UR STRIP.AUTO: 5.5 [PH] (ref 5–8)
PLATELET # BLD AUTO: 213 K/UL (ref 164–446)
PMV BLD AUTO: 8.8 FL (ref 9–12.9)
POTASSIUM SERPL-SCNC: 4.1 MMOL/L (ref 3.6–5.5)
PROT SERPL-MCNC: 7.1 G/DL (ref 6–8.2)
PROT UR QL STRIP: NEGATIVE MG/DL
PSA SERPL-MCNC: 0.82 NG/ML (ref 0–4)
RBC # BLD AUTO: 4.95 M/UL (ref 4.7–6.1)
RBC UR QL AUTO: NEGATIVE
SODIUM SERPL-SCNC: 142 MMOL/L (ref 135–145)
SP GR UR STRIP.AUTO: 1.01
SPECIMEN SOURCE: NORMAL
T PALLIDUM AB SER QL IA: NORMAL
TRIGL SERPL-MCNC: 128 MG/DL (ref 0–149)
UROBILINOGEN UR STRIP.AUTO-MCNC: 0.2 MG/DL
WBC # BLD AUTO: 5.3 K/UL (ref 4.8–10.8)

## 2024-02-12 PROCEDURE — 80053 COMPREHEN METABOLIC PANEL: CPT

## 2024-02-12 PROCEDURE — 87389 HIV-1 AG W/HIV-1&-2 AB AG IA: CPT

## 2024-02-12 PROCEDURE — 87591 N.GONORRHOEAE DNA AMP PROB: CPT

## 2024-02-12 PROCEDURE — 86780 TREPONEMA PALLIDUM: CPT

## 2024-02-12 PROCEDURE — 36415 COLL VENOUS BLD VENIPUNCTURE: CPT

## 2024-02-12 PROCEDURE — 85025 COMPLETE CBC W/AUTO DIFF WBC: CPT

## 2024-02-12 PROCEDURE — 80061 LIPID PANEL: CPT

## 2024-02-12 PROCEDURE — 81003 URINALYSIS AUTO W/O SCOPE: CPT

## 2024-02-12 PROCEDURE — 87491 CHLMYD TRACH DNA AMP PROBE: CPT

## 2024-02-12 PROCEDURE — 84153 ASSAY OF PSA TOTAL: CPT

## 2024-02-20 ENCOUNTER — OFFICE VISIT (OUTPATIENT)
Dept: INTERNAL MEDICINE | Facility: OTHER | Age: 64
End: 2024-02-20
Payer: OTHER GOVERNMENT

## 2024-02-20 VITALS
HEART RATE: 66 BPM | WEIGHT: 156.8 LBS | OXYGEN SATURATION: 98 % | HEIGHT: 65 IN | DIASTOLIC BLOOD PRESSURE: 70 MMHG | SYSTOLIC BLOOD PRESSURE: 110 MMHG | BODY MASS INDEX: 26.12 KG/M2 | TEMPERATURE: 97.4 F

## 2024-02-20 DIAGNOSIS — R35.0 INCREASED URINARY FREQUENCY: ICD-10-CM

## 2024-02-20 DIAGNOSIS — I10 ESSENTIAL HYPERTENSION: ICD-10-CM

## 2024-02-20 DIAGNOSIS — I25.10 ASCVD (ARTERIOSCLEROTIC CARDIOVASCULAR DISEASE): ICD-10-CM

## 2024-02-20 DIAGNOSIS — R74.01 ALT (SGPT) LEVEL RAISED: ICD-10-CM

## 2024-02-20 DIAGNOSIS — I20.9 ANGINA PECTORIS (HCC): ICD-10-CM

## 2024-02-20 DIAGNOSIS — F17.210 CIGARETTE NICOTINE DEPENDENCE WITHOUT COMPLICATION: ICD-10-CM

## 2024-02-20 PROCEDURE — 99214 OFFICE O/P EST MOD 30 MIN: CPT | Mod: GC | Performed by: HOSPITALIST

## 2024-02-20 PROCEDURE — 3074F SYST BP LT 130 MM HG: CPT | Performed by: HOSPITALIST

## 2024-02-20 PROCEDURE — 3078F DIAST BP <80 MM HG: CPT | Performed by: HOSPITALIST

## 2024-02-20 PROCEDURE — 93000 ELECTROCARDIOGRAM COMPLETE: CPT | Mod: GC | Performed by: HOSPITALIST

## 2024-02-20 RX ORDER — TAMSULOSIN HYDROCHLORIDE 0.4 MG/1
0.4 CAPSULE ORAL
Qty: 30 CAPSULE | Refills: 1 | Status: SHIPPED | OUTPATIENT
Start: 2024-02-20 | End: 2024-03-18

## 2024-02-20 RX ORDER — ATORVASTATIN CALCIUM 40 MG/1
40 TABLET, FILM COATED ORAL EVERY EVENING
Qty: 90 TABLET | Refills: 1 | Status: SHIPPED | OUTPATIENT
Start: 2024-02-20

## 2024-02-20 ASSESSMENT — FIBROSIS 4 INDEX: FIB4 SCORE: 1.17

## 2024-02-20 ASSESSMENT — PATIENT HEALTH QUESTIONNAIRE - PHQ9: CLINICAL INTERPRETATION OF PHQ2 SCORE: 0

## 2024-02-20 NOTE — PATIENT INSTRUCTIONS
Today we started you on atorvastatin for cholesterol and tamsulosin for your increased urination. If you can, take tamsulosin in the morning, after breakfast or the first meal or snack of the day. This is because the highest levels of medicine are in your body 6 hours after you take it. This will give you the most benefit during the daytime when you're most likely trying to pee. Check your blood pressure before taking the lisinopril in the afternoon or if you feel dizzy after taking the tamsulosin.     Thank you for allowing us to participate in your care today. Please follow up in 4 weeks at the end of March after your colonoscopy on 3/19/24

## 2024-02-20 NOTE — PROGRESS NOTES
"Subjective     Carlos Alberto Gary is a 63 y.o. male with a past medical history that includes nicotine dependence and increased urinary frequency who presents with Lab Results              HPI  1. Angina pectoris (HCC)  Started 1 month ago, occurs every other day  Sometimes at rest and sometimes with activity  Improves with rest, can worsen with deep breathing  Both parents have had heart attacks mom at age 57, dad had heart attack and stroke at age 66  Reports intermittent wheezing with and without chest pain  Continues to smoke, is not interested in quitting smoking at this time  Chest pain typically lasts for 5-10 minutes with relaxing.    2. Increased urinary frequency  Concerned about his increased urination  Increased urination of ~1 year duration, primarily occurring at night  Denies difficulty with urination initiation and hematuria  Reports nocturnal urination 2-5 times a night  Labs 2/12/2024: Syphilis nonreactive, GFR 93, chlamydia negative, urinalysis within normal limits, HIV nonreactive, PSA 0.82, lipid panel within normal limits    3. Essential hypertension  Checks BP at home in the afternoon, SBP's in the 130's and 140's   Reports compliance with lisinopril 40mg daily in the afternoon  Diet: fish, vegetables, rice, chicken, and soy sauce but has cut down on the soy sauce and is trying to \"eliminate it\"  Exercise: walks 50-60 minutes at least daily    4. ASCVD (arteriosclerotic cardiovascular disease)  At 12.5% based on continued smoking of tobacco  ASCVD risk calculation based off of no history of diabetes, being a current smoker, T cholesterol 172, HDL 52, LDL 94    5. Cigarette nicotine dependence without complication  Smoker of 30 years duration  Currently smoking 5 cigarettes a day  Patient reports allowing to quit at this time but will let me know if he is    6. ALT (SGPT) level raised  ALT level at 70 based off of labs from 2/12/2024.  Patient reports having 2-3 beers a day.  Per chart " "review, patient had hepatitis labs done recently, and were negative per previous provider Dr. Cohn's note      ROS  Review of systems as described in HPI.           Objective     /70 (BP Location: Left arm, Patient Position: Sitting, BP Cuff Size: Adult)   Pulse 66   Temp 36.3 °C (97.4 °F) (Temporal)   Ht 1.651 m (5' 5\")   Wt 71.1 kg (156 lb 12.8 oz)   SpO2 98%   BMI 26.09 kg/m²      Physical Exam  Constitutional:       General: He is not in acute distress.     Appearance: He is not ill-appearing or toxic-appearing.   HENT:      Head: Normocephalic and atraumatic.      Nose: No rhinorrhea.   Eyes:      General: No scleral icterus.        Right eye: No discharge.         Left eye: No discharge.      Extraocular Movements: Extraocular movements intact.   Neck:      Comments: Lipoma appreciated at the top of right side of neck  Cardiovascular:      Rate and Rhythm: Normal rate.   Pulmonary:      Effort: Pulmonary effort is normal. No respiratory distress.   Musculoskeletal:         General: Normal range of motion.      Cervical back: Normal range of motion and neck supple. No rigidity.   Skin:     Coloration: Skin is not jaundiced.   Neurological:      Gait: Gait normal.   Psychiatric:         Mood and Affect: Mood normal.                 Assessment & Plan        1. Angina pectoris (HCC)  Patient at risk for heart disease based off of current smoking status, past medical history, and family history.  In office EKG not concerning for an acute ischemic event.  Patient currently denies chest pain. Will order chemical stress test for further evaluation patient reports chronic bilateral knee degeneration that limits his ambulation.   - EKG - Clinic Performed  - NM-CARDIAC STRESS TEST; Future    2. Increased urinary frequency  Increased urinary frequency, primarily at night.  Urinalysis within normal limits, STI testing negative.  Will initiate tamsulosin. Take tamsulosin in the morning, after breakfast or " the first meal or snack of the day. This is because the highest levels of medicine are in your body 6 hours after you take it. This will give you the most benefit during the daytime when you're most likely trying to pee. Check your blood pressure before taking the lisinopril in the afternoon or if you feel dizzy after taking the tamsulosin.   - tamsulosin (FLOMAX) 0.4 MG capsule; Take 1 Capsule by mouth 1/2 hour after breakfast.  Dispense: 30 Capsule; Refill: 1    3. Essential hypertension  In office blood pressure at goal at 110/70.  Patient initiated on tamsulosin.  Advised that it could affect blood pressure and asked to check his blood pressure after taking the tamsulosin and to abstain from taking lisinopril should his systolic blood pressure be less than 120 or should he be having lightheadedness.  -Continue lisinopril 40 mg daily  -Continue diet and lifestyle modifications previously discussed such as cutting out soy sauce and continue to walk and exercise at least 30 minutes a day; patient currently walking 50 to 60 minutes daily.  Home blood pressure monitoring:  - check your blood pressure every day and put it in a log  - pick a different time each day so we can see how it varies throughout the day  - when you check your blood pressure: make sure you sit quietly for 5-10 minutes beforehand, keep both feet flat on the ground, and make sure you use an arm cuff at heart height    Lifestyle factors to help manage blood pressure:  1) reduce stress with daily activity, consider yoga, breathing exercises (like 4-7-8 breathing technique)   2) reduce sodium to <2000 mg/day  3) DASH diet     4) 200-300 min/week cardio, which you can build up to.       4. ASCVD (arteriosclerotic cardiovascular disease)  ASCVD risk score of 12.5%, will initiate statin today.  - atorvastatin (LIPITOR) 40 MG Tab; Take 1 Tablet by mouth every evening.  Dispense: 90 Tablet; Refill: 1    5. Cigarette nicotine dependence without  complication  Tobacco cessation counseling was given as patient has a history of tobacco dependence.  Counseling involved discussing the harmful cardiovascular effects including accelerated atherosclerosis, risk factor for stroke, coronary artery disease and heart attack.    -After discussion, patient is NOT to quit smoking.      6. ALT (SGPT) level raised  ALT elevated at 70.  Previous hepatitis screening was negative per patient per previous PCPs Dr. Wall hepatitis panel was negative in the past.  Patient advised to limit alcohol intake to less than 2 alcoholic beverages a day.      Salima Messina MD MPH  PGY-3  [facility]     Please note that this dictation was created using voice recognition software. I have made every reasonable attempt to correct obvious errors, but I expect that there are errors of grammar and possibly content that I did not discover before finalizing the note.    Follow up: Thank you for allowing us to participate in your care today. Please follow up in 4 weeks at the end of March after your colonoscopy on 3/19/24

## 2024-03-01 ENCOUNTER — HOSPITAL ENCOUNTER (OUTPATIENT)
Dept: RADIOLOGY | Facility: MEDICAL CENTER | Age: 64
End: 2024-03-01
Attending: HOSPITALIST
Payer: OTHER GOVERNMENT

## 2024-03-01 DIAGNOSIS — I20.9 ANGINA PECTORIS (HCC): ICD-10-CM

## 2024-03-01 PROCEDURE — 700111 HCHG RX REV CODE 636 W/ 250 OVERRIDE (IP)

## 2024-03-01 PROCEDURE — A9502 TC99M TETROFOSMIN: HCPCS

## 2024-03-01 RX ORDER — REGADENOSON 0.08 MG/ML
INJECTION, SOLUTION INTRAVENOUS
Status: COMPLETED
Start: 2024-03-01 | End: 2024-03-01

## 2024-03-01 RX ORDER — AMINOPHYLLINE 25 MG/ML
100 INJECTION, SOLUTION INTRAVENOUS
Status: DISCONTINUED | OUTPATIENT
Start: 2024-03-01 | End: 2024-03-02 | Stop reason: HOSPADM

## 2024-03-01 RX ORDER — REGADENOSON 0.08 MG/ML
0.4 INJECTION, SOLUTION INTRAVENOUS ONCE
Status: DISCONTINUED | OUTPATIENT
Start: 2024-03-01 | End: 2024-03-02 | Stop reason: HOSPADM

## 2024-03-01 RX ADMIN — REGADENOSON 0.4 MG: 0.08 INJECTION, SOLUTION INTRAVENOUS at 08:51

## 2024-03-14 DIAGNOSIS — R35.0 INCREASED URINARY FREQUENCY: ICD-10-CM

## 2024-03-18 RX ORDER — TAMSULOSIN HYDROCHLORIDE 0.4 MG/1
0.4 CAPSULE ORAL
Qty: 90 CAPSULE | Refills: 1 | Status: SHIPPED | OUTPATIENT
Start: 2024-03-18

## 2024-04-02 ENCOUNTER — APPOINTMENT (OUTPATIENT)
Dept: INTERNAL MEDICINE | Facility: OTHER | Age: 64
End: 2024-04-02
Payer: OTHER GOVERNMENT

## 2024-04-02 VITALS
OXYGEN SATURATION: 99 % | HEART RATE: 61 BPM | BODY MASS INDEX: 26.49 KG/M2 | SYSTOLIC BLOOD PRESSURE: 129 MMHG | HEIGHT: 65 IN | TEMPERATURE: 97.6 F | DIASTOLIC BLOOD PRESSURE: 78 MMHG | WEIGHT: 159 LBS

## 2024-04-02 DIAGNOSIS — I20.9 ANGINA PECTORIS (HCC): ICD-10-CM

## 2024-04-02 DIAGNOSIS — D12.6 ADENOMATOUS POLYP OF COLON, UNSPECIFIED PART OF COLON: ICD-10-CM

## 2024-04-02 DIAGNOSIS — F17.210 CIGARETTE NICOTINE DEPENDENCE WITHOUT COMPLICATION: ICD-10-CM

## 2024-04-02 DIAGNOSIS — R35.0 INCREASED URINARY FREQUENCY: ICD-10-CM

## 2024-04-02 DIAGNOSIS — I10 ESSENTIAL HYPERTENSION: ICD-10-CM

## 2024-04-02 PROBLEM — Z00.00 ENCOUNTER FOR PREVENTIVE HEALTH EXAMINATION: Status: RESOLVED | Noted: 2023-11-20 | Resolved: 2024-04-02

## 2024-04-02 PROCEDURE — 3074F SYST BP LT 130 MM HG: CPT | Mod: GC | Performed by: HOSPITALIST

## 2024-04-02 PROCEDURE — 3078F DIAST BP <80 MM HG: CPT | Mod: GC | Performed by: HOSPITALIST

## 2024-04-02 PROCEDURE — 99214 OFFICE O/P EST MOD 30 MIN: CPT | Mod: GC | Performed by: HOSPITALIST

## 2024-04-02 PROCEDURE — 1126F AMNT PAIN NOTED NONE PRSNT: CPT | Mod: GC | Performed by: HOSPITALIST

## 2024-04-02 RX ORDER — TAMSULOSIN HYDROCHLORIDE 0.4 MG/1
0.4 CAPSULE ORAL
Qty: 90 CAPSULE | Refills: 2 | Status: SHIPPED | OUTPATIENT
Start: 2024-04-02

## 2024-04-02 ASSESSMENT — PAIN SCALES - GENERAL: PAINLEVEL: NO PAIN

## 2024-04-02 ASSESSMENT — FIBROSIS 4 INDEX: FIB4 SCORE: 1.17

## 2024-04-02 NOTE — PROGRESS NOTES
Subjective     Carlos Alberto Gary is a 63 y.o. male who presents with Hypertension and Results (Patient here for stress test [id number])            HPI  1. Essential hypertension  Checks BP at home in the afternoon, SBP's in the 130's  Reports compliance with lisinopril 40mg daily in the afternoon and tamsulosin in the morning  Reports intermittent lightheadedness, has checked his blood pressure during this moments and reports that his systolic blood pressure is in the 130's  Diet: fish, vegetables, rice, chicken  He reports stopping the use of soy sauce in March 2024  Exercise: walks at least 1 hour daily    2. Increased urinary frequency  Was concerned about his increased urination  Increased urination of ~1 year duration, primarily occurring at night  He reports that the tamsulosin has been helpful in decreasing his urination frequency. He reports urinating only 2-3 times at night as opposed to 5-6 times at night before the tamsulosin   Denies difficulty with urination initiation, dysuria, and hematuria    Labs 2/12/2024: Syphilis nonreactive, GFR 93, chlamydia negative, urinalysis within normal limits, HIV nonreactive, PSA 0.82, lipid panel within normal limits    3. Adenomatous polyp of colon, unspecified part of colon  Had a colonoscopy: 3 adenomatous polyps removed, including hemorrhoids. Repeat colonoscopy in 3 years in 2027  Denies: belly pain, diarrhea, bloody and black stool  Reports some constipation intermittently   Drinks almost 1/2 to 1 gallon daily    4. Cigarette nicotine dependence without complication  Smoker of 30 years duration  Currently smoking 5 cigarettes a day  Patient reports allowing to quit at this time but will let me know if he is  Despite the 3 adenomatous polyps in his colon per colonoscopy he is not willing to quit smoking at this time  Denies: cough  Reports: intermittent chest pain and shortness of breath with running. He reports weekly lightheadedness and dizziness  "sometimes when getting up or while walking     5. Angina pectoris  Started 1 month ago, occurs every other day  Sometimes at rest and sometimes with activity  Improves with rest, can worsen with deep breathing  Both parents have had heart attacks mom at age 57, dad had heart attack and stroke at age 66  Reports intermittent wheezing with and without chest pain  Continues to smoke, is not interested in quitting smoking at this time  Chest pain typically lasts for 5-10 minutes with relaxing.    ROS  Review of systems as described in HPI.           Objective     /78 (BP Location: Right arm, Patient Position: Sitting, BP Cuff Size: Large adult)   Pulse 61   Temp 36.4 °C (97.6 °F) (Temporal)   Ht 1.651 m (5' 5\")   Wt 72.1 kg (159 lb)   SpO2 99%   BMI 26.46 kg/m²      Physical Exam  Constitutional:       General: He is not in acute distress.     Appearance: He is not ill-appearing or toxic-appearing.   HENT:      Head: Normocephalic and atraumatic.      Nose: No rhinorrhea.      Mouth/Throat:      Comments: Poor dentition with some missing teeth  Eyes:      General: No scleral icterus.        Right eye: No discharge.         Left eye: No discharge.      Extraocular Movements: Extraocular movements intact.   Neck:      Comments: Lipoma appreciated at the top of right side of neck  Cardiovascular:      Rate and Rhythm: Normal rate.   Pulmonary:      Effort: Pulmonary effort is normal. No respiratory distress.   Musculoskeletal:         General: No tenderness. Normal range of motion.      Cervical back: Normal range of motion and neck supple. No rigidity.   Skin:     Coloration: Skin is not jaundiced or pale.   Neurological:      Mental Status: Mental status is at baseline.      Motor: No weakness.      Gait: Gait normal.   Psychiatric:         Mood and Affect: Mood normal.                 Assessment & Plan        1. Essential hypertension  In office blood pressure 129/78, which is at goal.  Patient advised to " "continue walking at least an hour a day, to abstain from soy sauce use, and to continue hydration with water.  Seeing how blood pressure is at goal, we will continue current regimen  - Continue lisinopril 40 mg daily  - Home blood pressure monitoring  Lifestyle factors to help manage blood pressure:  1) reduce stress with daily activity, consider yoga, breathing exercises (like 4-7-8 breathing technique)   2) reduce sodium to <2000 mg/day  3) DASH diet     4) 200-300 min/week cardio, which you can build up to.       2. Increased urinary frequency  Stable, controlled with current regimen  - tamsulosin (FLOMAX) 0.4 MG capsule; Take 1 Capsule by mouth 1/2 hour after breakfast.  Dispense: 90 Capsule; Refill: 2    3. Adenomatous polyp of colon, unspecified part of colon  Had a colonoscopy: 3 adenomatous polyps removed, including hemorrhoids. Repeat colonoscopy in 3 years    4. Cigarette nicotine dependence without complication  The [place] preventative task force recommends annual screening for lung cancer with low-dose CT in adults ages 50 to 80 who have a 20 pack-year smoking history and currently smoke or have quit within the past 15 years.  Tobacco cessation counseling was given as patient has a history of tobacco dependence.  Counseling involved discussing the harmful cardiovascular effects including accelerated atherosclerosis, risk factor for stroke, coronary artery disease and heart attack.    -After discussion, patient is NOT WILLING to quit smoking.  - CT-CHEST LOW DOSE W/O; Future     5. Angina pectoris  Patient informed that although nuclear medicine stress test reported \"normal left ventricular ejection fraction at 60%\",\" nondiagnostic stress EKG given baseline ST-T wave changes\" this does not preclude any underlying coronary artery disease and patient might need a coronary angiogram if chest pain persists with activity.  For now we will pursue a low-dose CT scan and see if his symptoms improve. Patient advised " "that If he is continuing to have chest pain please call 911 and go to the ED. If you continue to have chest pain with exertion you might need a coronary angiogram.   -Tobacco cessation recommended  -Management of essential hypertension    Follow up: Thank you for allowing us to participate in your care today. Please follow up in 12 weeks after your obtain the low dose CT scan. If you are continuing to have chest pain please call 911 and go to the ED. If you continue to have chest pain with exertion you might need a coronary angiogram.       Of note: Patient informed that although nuclear medicine stress test reported \"normal left ventricular ejection fraction at 60%\",\" nondiagnostic stress EKG given baseline ST-T wave changes\" this does not preclude any underlying coronary artery disease and patient might need a coronary angiogram if chest pain persists with activity.  For now we will pursue a low-dose CT scan and see if his symptoms improve.      Salima Messina MD MPH  PGY-3  [facility]     Please note that this dictation was created using voice recognition software. I have made every reasonable attempt to correct obvious errors, but I expect that there are errors of grammar and possibly content that I did not discover before finalizing the note.               "

## 2024-04-08 ENCOUNTER — TELEPHONE (OUTPATIENT)
Dept: INTERNAL MEDICINE | Facility: OTHER | Age: 64
End: 2024-04-08
Payer: OTHER GOVERNMENT

## 2024-04-08 DIAGNOSIS — F17.210 CIGARETTE NICOTINE DEPENDENCE WITHOUT COMPLICATION: ICD-10-CM

## 2024-04-08 NOTE — TELEPHONE ENCOUNTER
"Phone Number Called: [phone] (home)       Call outcome: Spoke to patient regarding message below.    Message: Spoke to pt to clarify. CT order is placed, however encounter type was under \"Office visit\" and not CT order, so it cannot be scheduled.    Routing to Dr. Messina so she can place a new CT order under CT order  "

## 2024-04-08 NOTE — TELEPHONE ENCOUNTER
SET FOR APPOINTMENT FOR REFERRAL  (Newest Message First)  Adelia Brennan routed conversation to r  [place] [facility]1 minute ago (1:14 PM)     Carlos Alberto JAIMES [facility] Customer Service1 hour ago (11:32 AM)       Topic: [facility] Referral Status.     Dear /Sima,            Good morning.   I was seen by dr. Salima Flanagan on 2 April 2024.  She was referred me to CT scan at [facility] but unfortunately, they do not have the copy of the order from my doctor.  Please send them the copy of the referral so they can put me an schedule appointment.  Please advise.  Have a great day.  V/r,

## 2024-04-10 DIAGNOSIS — Z12.11 SCREENING FOR COLORECTAL CANCER: ICD-10-CM

## 2024-04-10 DIAGNOSIS — F17.210 CIGARETTE NICOTINE DEPENDENCE WITHOUT COMPLICATION: ICD-10-CM

## 2024-04-10 DIAGNOSIS — Z12.12 SCREENING FOR COLORECTAL CANCER: ICD-10-CM

## 2024-04-10 NOTE — TELEPHONE ENCOUNTER
Phone Number Called: [phone] (home)       Call outcome: Spoke to patient regarding message below.    Message: Spoke to patient and informed him that I verified with the imaging department regarding CT order and that it was okay. Rep stated it takes 48 hours for the order to populate in their system so they will call patient on Friday, but pt was instructure to call if they didn't hear anything by Friday. Closing enc

## 2024-04-11 ENCOUNTER — TELEPHONE (OUTPATIENT)
Dept: HEALTH INFORMATION MANAGEMENT | Facility: OTHER | Age: 64
End: 2024-04-11
Payer: OTHER GOVERNMENT

## 2024-04-24 ENCOUNTER — HOSPITAL ENCOUNTER (OUTPATIENT)
Dept: RADIOLOGY | Facility: MEDICAL CENTER | Age: 64
End: 2024-04-24
Attending: HOSPITALIST
Payer: OTHER GOVERNMENT

## 2024-04-24 DIAGNOSIS — F17.210 CIGARETTE NICOTINE DEPENDENCE WITHOUT COMPLICATION: ICD-10-CM

## 2024-04-24 PROCEDURE — 71271 CT THORAX LUNG CANCER SCR C-: CPT

## 2024-05-16 DIAGNOSIS — I10 ESSENTIAL HYPERTENSION: ICD-10-CM

## 2024-05-16 NOTE — TELEPHONE ENCOUNTER
Received request via: Pharmacy    Was the patient seen in the last year in this department? Yes    Does the patient have an active prescription (recently filled or refills available) for medication(s) requested? No    [facility]    Does the patient have [facility] and need 100 day supply (blood pressure, diabetes and cholesterol meds only)? Patient does not have SCP

## 2024-05-22 DIAGNOSIS — I25.10 ASCVD (ARTERIOSCLEROTIC CARDIOVASCULAR DISEASE): ICD-10-CM

## 2024-05-22 NOTE — TELEPHONE ENCOUNTER
Received request via: Pharmacy    Was the patient seen in the last year in this department? Yes    Does the patient have an active prescription (recently filled or refills available) for medication(s) requested? No    [facility]    Does the patient have skilled nursing Plus and need 100 day supply (blood pressure, diabetes and cholesterol meds only)? Patient does not have SCP

## 2024-05-23 RX ORDER — LISINOPRIL 40 MG/1
TABLET ORAL
Qty: 90 TABLET | Refills: 1 | Status: SHIPPED | OUTPATIENT
Start: 2024-05-23

## 2024-05-23 RX ORDER — ATORVASTATIN CALCIUM 40 MG/1
40 TABLET, FILM COATED ORAL EVERY EVENING
Qty: 90 TABLET | Refills: 1 | Status: SHIPPED | OUTPATIENT
Start: 2024-05-23

## 2024-06-18 ENCOUNTER — OFFICE VISIT (OUTPATIENT)
Dept: INTERNAL MEDICINE | Facility: OTHER | Age: 64
End: 2024-06-18
Payer: OTHER GOVERNMENT

## 2024-06-18 VITALS
OXYGEN SATURATION: 99 % | HEART RATE: 70 BPM | HEIGHT: 65 IN | SYSTOLIC BLOOD PRESSURE: 115 MMHG | TEMPERATURE: 97.3 F | WEIGHT: 158.4 LBS | BODY MASS INDEX: 26.39 KG/M2 | DIASTOLIC BLOOD PRESSURE: 74 MMHG

## 2024-06-18 DIAGNOSIS — I10 HYPERTENSION, UNSPECIFIED TYPE: ICD-10-CM

## 2024-06-18 DIAGNOSIS — I25.10 ASCVD (ARTERIOSCLEROTIC CARDIOVASCULAR DISEASE): ICD-10-CM

## 2024-06-18 DIAGNOSIS — R07.89 OTHER CHEST PAIN: ICD-10-CM

## 2024-06-18 DIAGNOSIS — R35.0 INCREASED URINARY FREQUENCY: ICD-10-CM

## 2024-06-18 DIAGNOSIS — R91.1 LUNG NODULE: ICD-10-CM

## 2024-06-18 DIAGNOSIS — R06.2 WHEEZING: ICD-10-CM

## 2024-06-18 PROBLEM — N40.0 BENIGN PROSTATIC HYPERPLASIA: Status: ACTIVE | Noted: 2024-06-18

## 2024-06-18 PROBLEM — K63.5 POLYP OF COLON: Status: ACTIVE | Noted: 2024-03-19

## 2024-06-18 PROCEDURE — 99214 OFFICE O/P EST MOD 30 MIN: CPT | Mod: GC | Performed by: HOSPITALIST

## 2024-06-18 PROCEDURE — 3078F DIAST BP <80 MM HG: CPT | Mod: GC | Performed by: HOSPITALIST

## 2024-06-18 PROCEDURE — 3074F SYST BP LT 130 MM HG: CPT | Mod: GC | Performed by: HOSPITALIST

## 2024-06-18 RX ORDER — TAMSULOSIN HYDROCHLORIDE 0.4 MG/1
0.4 CAPSULE ORAL
Qty: 90 CAPSULE | Refills: 1 | Status: SHIPPED | OUTPATIENT
Start: 2024-06-18

## 2024-06-18 RX ORDER — LISINOPRIL 40 MG/1
TABLET ORAL
Qty: 90 TABLET | Refills: 1 | Status: SHIPPED | OUTPATIENT
Start: 2024-06-18

## 2024-06-18 RX ORDER — ATORVASTATIN CALCIUM 40 MG/1
40 TABLET, FILM COATED ORAL EVERY EVENING
Qty: 90 TABLET | Refills: 1 | Status: SHIPPED | OUTPATIENT
Start: 2024-06-18 | End: 2024-06-18

## 2024-06-18 RX ORDER — ATORVASTATIN CALCIUM 80 MG/1
80 TABLET, FILM COATED ORAL EVERY EVENING
Qty: 90 TABLET | Refills: 1 | Status: SHIPPED | OUTPATIENT
Start: 2024-06-18

## 2024-06-18 RX ORDER — LIDOCAINE 4 G/G
1 PATCH TOPICAL EVERY 24 HOURS
Qty: 30 PATCH | Refills: 1 | Status: SHIPPED | OUTPATIENT
Start: 2024-06-18

## 2024-06-18 ASSESSMENT — FIBROSIS 4 INDEX: FIB4 SCORE: 1.19

## 2024-06-18 ASSESSMENT — PAIN SCALES - GENERAL: PAINLEVEL: NO PAIN

## 2024-06-18 NOTE — PROGRESS NOTES
"Subjective     Carlos Alberto Gary is a 64 y.o. male who presents with Hypertension and Follow-Up  Past medical history includes colon polyps, hypertension, and lung nodule.           HPI  1. Hypertension, unspecified type  Controlled, in office /74  Checks BP at home in the afternoon, SBP's in the 130's  Reports compliance with lisinopril 40mg daily in the afternoon and tamsulosin in the morning  Would like to start taking lisinopril in the evenings with atorvastatin     2. Lung nodule  4/24/24: low dose CT with 3mm RML nodule. Continue annual screening with LDCT in 12 months     3. Other chest pain  Started January 2024, occurs every other day but sometimes everyday  Sometimes at rest and sometimes with activity  The pain is localized to his left upper chest and can be elicited by pushing the area  Improves with rest, can worsen with deep breathing  Sometimes the chest pain is felt with resting and walking   Both parents have had heart attacks mom at age 57, dad had heart attack and stroke at age 66  Reports intermittent wheezing with and without chest pain  Continues to smoke, is not interested in quitting smoking at this time  Chest pain typically lasts for 2 minutes and is better with a salonpas and with relaxing.  Stress test: LVEF is 60%.   Diaphragmatic attenuation of the inferior wall activity artificially suggests decreased wall thickening, but it is normal visually on the gated images.      4.  Wheezing  Intermittent  Some wheezing with walking and shortness of breath     ROS  Review of systems as described in HPI.           Objective     /74 (BP Location: Left arm, Patient Position: Sitting, BP Cuff Size: Adult)   Pulse 70   Temp 36.3 °C (97.3 °F) (Temporal)   Ht 1.651 m (5' 5\")   Wt 71.8 kg (158 lb 6.4 oz)   SpO2 99%   BMI 26.36 kg/m²      Physical Exam  Constitutional:       General: He is not in acute distress.     Appearance: He is not ill-appearing or toxic-appearing.   HENT:      " Head: Normocephalic and atraumatic.      Nose: No rhinorrhea.      Mouth/Throat:      Comments: Poor dentition with some missing teeth  Eyes:      General: No scleral icterus.        Right eye: No discharge.         Left eye: No discharge.      Extraocular Movements: Extraocular movements intact.   Neck:      Comments: Lipoma appreciated at the top of right side of neck  Cardiovascular:      Rate and Rhythm: Normal rate.   Pulmonary:      Effort: Pulmonary effort is normal. No respiratory distress.   Musculoskeletal:         General: Tenderness (tenderness with palpation of left upper chest) present. Normal range of motion.      Cervical back: Normal range of motion and neck supple. No rigidity.   Skin:     Coloration: Skin is not jaundiced or pale.      Comments: Skin discoloration on top of chest   Neurological:      Mental Status: Mental status is at baseline.      Motor: No weakness.      Gait: Gait normal.   Psychiatric:         Mood and Affect: Mood normal.               Assessment & Plan        1. Hypertension, unspecified type  Stable, controlled  Will continue current regimen  - Comp Metabolic Panel; Future  - CBC WITH DIFFERENTIAL; Future  - lisinopril (PRINIVIL) 40 MG tablet; TAKE 1 TABLET BY MOUTH EVERY DAY  DAYS.  Dispense: 90 Tablet; Refill: 1    2. Lung nodule  4/24/24: low dose CT with 3mm RML nodule. Continue annual screening with LDCT in 12 months   Tobacco cessation counseling was given as patient has a history of tobacco dependence.  Counseling involved discussing the harmful cardiovascular effects including accelerated atherosclerosis, risk factor for stroke, coronary artery disease and heart attack.    -After discussion, patient is NOT to quit smoking.    3. Other chest pain  Reproducible with palpation, likely musculoskeletal.   Patient also reports worsening in pain with palpation of the site  Considering family history there may be some component of cardiovascular disease contributing  "to his chest pain  Patient informed that although nuclear medicine stress test reported \"normal left ventricular ejection fraction at 60%\",\" nondiagnostic stress EKG given baseline ST-T wave changes\" this does not preclude any underlying coronary artery disease and patient might need a coronary angiogram if chest pain persists with activity.  For now we will pursue a low-dose CT scan and see if his symptoms improve.  - lidocaine (ASPERFLEX) 4 % Patch; Place 1 Patch on the skin every 24 hours.  Dispense: 30 Patch; Refill: 1    4. ASCVD (arteriosclerotic cardiovascular disease)  ASCVD risk elevated, will increase atorvastatin from 40 to 80  - atorvastatin (LIPITOR) 80 MG tablet; Take 1 Tablet by mouth every evening.  Dispense: 90 Tablet; Refill: 1    5. Increased urinary frequency  Chronic  Well controlled with tamsulosin  - tamsulosin (FLOMAX) 0.4 MG capsule; Take 1 Capsule by mouth 1/2 hour after breakfast.  Dispense: 90 Capsule; Refill: 1    6. Wheezing  Shortness of breath and wheezing in the setting of extensive smoking history  No mention of emphysema or findings other than 3mm RML nodule on low dose CT  Will order PFT's for further evaluation considering smoking history and wheeezing in the setting of shortness of breath  - PULMONARY FUNCTION TESTS -Test requested: Complete Pulmonary Function Test; Future    Follow up: Thank you for allowing us to participate in your care today. Please follow up in 12 weeks or earlier as needed       Salima Messina MD MPH  PGY-3  [facility]     Please note that this dictation was created using voice recognition software. I have made every reasonable attempt to correct obvious errors, but I expect that there are errors of grammar and possibly content that I did not discover before finalizing the note.                  "

## 2024-06-18 NOTE — PATIENT INSTRUCTIONS
Thank you for allowing us to participate in your care today. Please follow up in 12 weeks or earlier as needed

## 2024-07-09 ENCOUNTER — NON-PROVIDER VISIT (OUTPATIENT)
Dept: SLEEP MEDICINE | Facility: MEDICAL CENTER | Age: 64
End: 2024-07-09
Attending: HOSPITALIST
Payer: OTHER GOVERNMENT

## 2024-07-09 VITALS — HEIGHT: 66 IN | WEIGHT: 157.1 LBS | BODY MASS INDEX: 25.25 KG/M2

## 2024-07-09 DIAGNOSIS — R06.2 WHEEZING: ICD-10-CM

## 2024-07-09 PROCEDURE — 94726 PLETHYSMOGRAPHY LUNG VOLUMES: CPT | Performed by: INTERNAL MEDICINE

## 2024-07-09 PROCEDURE — 94726 PLETHYSMOGRAPHY LUNG VOLUMES: CPT | Mod: 26 | Performed by: INTERNAL MEDICINE

## 2024-07-09 PROCEDURE — 94060 EVALUATION OF WHEEZING: CPT | Mod: 26 | Performed by: INTERNAL MEDICINE

## 2024-07-09 PROCEDURE — 94729 DIFFUSING CAPACITY: CPT | Performed by: INTERNAL MEDICINE

## 2024-07-09 PROCEDURE — 94729 DIFFUSING CAPACITY: CPT | Mod: 26 | Performed by: INTERNAL MEDICINE

## 2024-07-09 PROCEDURE — 94060 EVALUATION OF WHEEZING: CPT | Performed by: INTERNAL MEDICINE

## 2024-07-09 ASSESSMENT — FIBROSIS 4 INDEX: FIB4 SCORE: 1.19

## 2024-08-12 ENCOUNTER — PATIENT MESSAGE (OUTPATIENT)
Dept: SLEEP MEDICINE | Facility: MEDICAL CENTER | Age: 64
End: 2024-08-12
Payer: OTHER GOVERNMENT

## 2024-08-20 ENCOUNTER — TELEPHONE (OUTPATIENT)
Dept: INTERNAL MEDICINE | Facility: OTHER | Age: 64
End: 2024-08-20
Payer: OTHER GOVERNMENT

## 2024-08-20 NOTE — TELEPHONE ENCOUNTER
Phone Number Called: There are no phone numbers on file. [phone].    Call outcome: Did not leave a detailed message. Requested patient to call back.    Message: Patient has no voice message set up to leave message. Patient requesting an answer taking Tamsulosin, Atorvastatin and Lisinopril patient wants to take an anti acid for gerd.

## 2024-08-22 NOTE — PATIENT COMMUNICATION
Phone Number Called: [phone]     Call outcome: Spoke to patient regarding message below.    Message: Patient requesting an answer to his anti acid question. I will route to Dr Ballesteros and r Providers for review. Spoke to patient notify ok to take anti acid. Patient will keep his appointment for 09/03/2024.

## 2024-08-22 NOTE — PATIENT COMMUNICATION
Patient called the office in regards to this [employer] message that he had sent. Patient is very concerned and hope for an answer soon. Thank you.

## 2024-08-26 NOTE — TELEPHONE ENCOUNTER
Phone Number Called: [phone]    Call outcome: Spoke to patient regarding message below.    Message: informed patient that it is okay to take an antacid while taking those three other medications.  Patient understood, closing encounter.

## 2024-08-26 NOTE — TELEPHONE ENCOUNTER
Patient is currently taking Lisinopril, Tamsulosin, and Atorvastatin.   He is wondering if it's safe for him to also take an antacid for his heartburn.     Routing to [place], as Dr. Messina has graduated, thanks.

## 2024-08-28 ENCOUNTER — OFFICE VISIT (OUTPATIENT)
Dept: INTERNAL MEDICINE | Facility: OTHER | Age: 64
End: 2024-08-28
Payer: OTHER GOVERNMENT

## 2024-08-28 VITALS
OXYGEN SATURATION: 96 % | DIASTOLIC BLOOD PRESSURE: 65 MMHG | WEIGHT: 159.8 LBS | SYSTOLIC BLOOD PRESSURE: 101 MMHG | HEART RATE: 81 BPM | HEIGHT: 65 IN | TEMPERATURE: 97.4 F | BODY MASS INDEX: 26.62 KG/M2

## 2024-08-28 DIAGNOSIS — H25.9 AGE-RELATED CATARACT OF BOTH EYES, UNSPECIFIED AGE-RELATED CATARACT TYPE: ICD-10-CM

## 2024-08-28 DIAGNOSIS — I10 HYPERTENSION, UNSPECIFIED TYPE: ICD-10-CM

## 2024-08-28 DIAGNOSIS — F17.210 CIGARETTE NICOTINE DEPENDENCE WITHOUT COMPLICATION: ICD-10-CM

## 2024-08-28 DIAGNOSIS — R12 HEART BURN: ICD-10-CM

## 2024-08-28 DIAGNOSIS — Z00.00 HEALTH CARE MAINTENANCE: ICD-10-CM

## 2024-08-28 DIAGNOSIS — R91.1 LUNG NODULE: ICD-10-CM

## 2024-08-28 PROBLEM — M75.20 BICEPS TENDINITIS: Status: RESOLVED | Noted: 2023-11-20 | Resolved: 2024-08-28

## 2024-08-28 RX ORDER — NICOTINE 21 MG/24HR
1 PATCH, TRANSDERMAL 24 HOURS TRANSDERMAL EVERY 24 HOURS
Qty: 30 PATCH | Refills: 2 | Status: SHIPPED | OUTPATIENT
Start: 2024-08-28

## 2024-08-28 ASSESSMENT — LIFESTYLE VARIABLES
HOW OFTEN DO YOU HAVE A DRINK CONTAINING ALCOHOL: 4 OR MORE TIMES A WEEK
HOW MANY STANDARD DRINKS CONTAINING ALCOHOL DO YOU HAVE ON A TYPICAL DAY: 1 OR 2
SKIP TO QUESTIONS 9-10: 1
HOW OFTEN DO YOU HAVE SIX OR MORE DRINKS ON ONE OCCASION: NEVER
AUDIT-C TOTAL SCORE: 4

## 2024-08-28 ASSESSMENT — ENCOUNTER SYMPTOMS
SPUTUM PRODUCTION: 0
NAUSEA: 0
SORE THROAT: 0
CHILLS: 0
DIZZINESS: 0
BLOOD IN STOOL: 0
NERVOUS/ANXIOUS: 0
COUGH: 0
HEADACHES: 0
CLAUDICATION: 0
PALPITATIONS: 0
FEVER: 0
ORTHOPNEA: 0
VOMITING: 0
BACK PAIN: 0
DIAPHORESIS: 0
WEAKNESS: 0
HEARTBURN: 1
SHORTNESS OF BREATH: 0
CONSTIPATION: 0
SINUS PAIN: 0
BLURRED VISION: 0
ABDOMINAL PAIN: 0

## 2024-08-28 ASSESSMENT — FIBROSIS 4 INDEX: FIB4 SCORE: 1.19

## 2024-08-28 ASSESSMENT — PAIN SCALES - GENERAL: PAINLEVEL: NO PAIN

## 2024-08-28 NOTE — PROGRESS NOTES
Established Patient    Patient Care Team:  Lilo Boles M.D. as PCP - General (Internal Medicine)    HPI:  Carlos Alberto Gary is a 64 y.o. male with relevant past medical history of Hypertension, BPH, BPPV, colon polyps on colonoscopy, lung nodule (3mm RML 4/24/24) and nicotine dependence who presents today for establishing care and routine follow-up    Preventative health measures:  Colonoscopy: done next in 2027 as colon polyps noted  Flu shot: done  Pneumococcal vaccine: done 6/2024  Eye exam: referral placed   Dental exam: will schedule in future.    Carlos Alberto has been experiencing heartburn/burning sensation rising from his stomach to his throat for the past month. Drinking lukewarm water gradually relieves the discomfort. He hasn't noticed any correlation with specific foods or times of day. While he's interested in trying medications to reduce acid reflux, he's concerned about any potential interactions with his current medications, Lisinopril, atorvastatin or Tamsulosin.    He recently underwent evaluation for chest pain with pulmonary function testing and nuclear stress test, which came out to be normal and his chest pain also resolved with lidocaine patches, likely musculoskeletal in origin.     Recent CT chest for lung cancer screening in setting of smoking history, detected a benign 3mm nodule in the right middle lobe of his lung. He is currently very motivated to quit smoking. He states that his daily cigarette consumption reduced from 10 to 6 with nicotine patches, but would like to try any oral pills to reduce his cravings.    Today,his blood pressure was low at 101/65. He reports occasional dizziness upon waking in the morning, which he attributes to his history of  BPPV.    He has requested a referral to an ophthalmologist due to cataracts detected during a recent optometry visit. He currently has no symptoms such as blurred or double vision.    Carlos Alberto wants to check labs for his liver and  kidney functions and schedule a date for his annual CT chest for lung cancer screening before he goes on a month-long vacation to the [facility] in November.      Review of Systems   Constitutional:  Negative for chills, diaphoresis and fever.   HENT:  Negative for congestion, sinus pain and sore throat.    Eyes:  Negative for blurred vision.   Respiratory:  Negative for cough, sputum production and shortness of breath.    Cardiovascular:  Negative for chest pain, palpitations, orthopnea, claudication and leg swelling.   Gastrointestinal:  Positive for heartburn. Negative for abdominal pain, blood in stool, constipation, melena, nausea and vomiting.   Genitourinary:  Negative for dysuria and urgency.   Musculoskeletal:  Positive for joint pain (both knees). Negative for back pain.   Neurological:  Negative for dizziness, weakness and headaches.   Psychiatric/Behavioral:  The patient is not nervous/anxious.    :    Past Medical History:   Diagnosis Date    Hypertension     Smoker        Social History     Tobacco Use    Smoking status: Every Day     Current packs/day: 0.50     Average packs/day: 1 pack/day for 39.7 years (38.8 ttl pk-yrs)     Types: Cigarettes     Start date: 1985    Smokeless tobacco: Never    Tobacco comments:     Now, patient smokes 6 cigarettes daily   Vaping Use    Vaping status: Never Used   Substance Use Topics    Alcohol use: Yes     Alcohol/week: 16.8 oz     Types: 28 Cans of beer per week     Comment: 1-2 drinks per day    Drug use: Never       Current Outpatient Medications   Medication Sig Dispense Refill    nicotine polacrilex (NICORETTE) 2 MG Gum Take 1 Each by mouth as needed for Smoking Cessation for up to 90 days. 100 Each 1    omeprazole (PRILOSEC) 20 MG delayed-release capsule Take 1 Capsule by mouth every day. 30 Capsule 1    nicotine (NICODERM) 14 MG/24HR PATCH 24 HR Place 1 Patch on the skin every 24 hours. 30 Patch 2    lisinopril (PRINIVIL) 40 MG tablet TAKE 1 TABLET BY MOUTH  "EVERY DAY  DAYS. 90 Tablet 1    tamsulosin (FLOMAX) 0.4 MG capsule Take 1 Capsule by mouth 1/2 hour after breakfast. 90 Capsule 1    atorvastatin (LIPITOR) 80 MG tablet Take 1 Tablet by mouth every evening. 90 Tablet 1     No current facility-administered medications for this visit.       /65 (BP Location: Left arm, Patient Position: Sitting, BP Cuff Size: Adult long)   Pulse 81   Temp 36.3 °C (97.4 °F) (Temporal)   Ht 1.651 m (5' 5\")   Wt 72.5 kg (159 lb 12.8 oz)   SpO2 96%   BMI 26.59 kg/m²   Physical Exam  Vitals reviewed.   Constitutional:       General: He is not in acute distress.     Appearance: Normal appearance. He is not ill-appearing.   HENT:      Head: Normocephalic.      Nose: Nose normal.      Mouth/Throat:      Mouth: Mucous membranes are moist.   Eyes:      Pupils: Pupils are equal, round, and reactive to light.   Cardiovascular:      Rate and Rhythm: Normal rate and regular rhythm.      Pulses: Normal pulses.      Heart sounds: Normal heart sounds.   Pulmonary:      Effort: Pulmonary effort is normal. No respiratory distress.      Breath sounds: Normal breath sounds.   Abdominal:      General: Bowel sounds are normal. There is no distension.      Palpations: Abdomen is soft.      Tenderness: There is no abdominal tenderness.   Musculoskeletal:         General: Normal range of motion.      Cervical back: Normal range of motion.      Right lower leg: No edema.      Left lower leg: No edema.   Skin:     General: Skin is warm.      Capillary Refill: Capillary refill takes less than 2 seconds.   Neurological:      General: No focal deficit present.      Mental Status: He is alert and oriented to person, place, and time.      Sensory: No sensory deficit.      Motor: No weakness.   Psychiatric:         Mood and Affect: Mood normal.         Behavior: Behavior is cooperative.           Assessment and Plan:     1. Heart burn  Recommended to try a proton pump inhibitor (omeprazole 20 mg) for " 4 weeks to suppress the acid production.   As PPI's may take some time for it to act, recommended to use antacids /Tums as needed.  Along with the medication strongly suggest making lifestyle modifications like -  Avoid trigger foods and drinks that are causing heart burn, eat smaller, more frequent meals and avoid eating before bed.  Elevate the head of the bed during sleeping to prevent acid reflux.    - omeprazole (PRILOSEC) 20 MG delayed-release capsule; Take 1 Capsule by mouth every day.  Dispense: 30 Capsule; Refill: 1    2. Hypertension, unspecified type  Today /65, home readings ranging 101 - 135 mmHg, with occasional dizziness, concern for hypotension as patient is on lisinopril 40 mg at night and tamsulosin 0.4 mg in the morning.  -Advised to cut down the dosage of lisinopril from 40-20 starting today and check blood pressure daily for the next 2 weeks and upload or bring them to us for further management.  If blood pressure readings are >130/80 consistently, will consider increasing dosage back to 40 mg.  CBC and CMP to check for other causes of dizziness were already placed by Dr Messina.  Recommended to get his labs done within the next couple of weeks.    3. Lung nodule  Benign 3 mm nodule in right middle lobe of the lung.  Advised to quit smoking, recommended to monitor annually with low-dose chest CT, in setting of long history of smoking.    - CT-LUNG CANCER-SCREENING; Future    4. Cigarette nicotine dependence without complication  Patient is very motivated to quit smoking, requested medication to control his cravings.  Advised to use combination of two different forms of nicotine replacement therapy.  Using nicotine patch for maintaining constant level of nicotine and nicotine gum to reduce cravings all through the day.  Discussed with the patient and together made a plan to stop smoking gradually with the goal to cut down daily consumption by half, by the end of the first month  (September), and another 50% by the end of second month and possibly quit by the end of third month (November).     - nicotine polacrilex (NICORETTE) 2 MG Gum; Take 1 Each by mouth as needed for Smoking Cessation for up to 90 days.  Dispense: 100 Each; Refill: 1  - nicotine (NICODERM) 14 MG/24HR PATCH 24 HR; Place 1 Patch on the skin every 24 hours.  Dispense: 30 Patch; Refill: 2  - CT-LUNG CANCER-SCREENING; Future    5. Age-related cataract of both eyes, unspecified age-related cataract type  - Referral to Ophthalmology placed.    6. Health care maintenance  - HEMOGLOBIN A1C ordered along with CBC and CMP orders placed by Dr Messina, as per patient request to check his renal and liver functions.       Return in about 4 months (around 12/28/2024).      Lilo Boles MD  PGY-1 Internal Medicine  [facility]    This note was created using voice recognition software.  While every attempt is made to ensure accuracy of transcription, occasionally errors occur.

## 2024-08-28 NOTE — PATIENT INSTRUCTIONS
Thank you for coming in today, please follow-up within Return in about 4 months (around 12/28/2024).    Go down on the dosage of lisinopril from  40 - 20,   Monitor your BP daily for the next two weeks and upload the picture to my chart.    Please get labs drawn in ~1 wk ( fasting needed)    Please call our clinic if you have any future questions or concerns, or If you'd like to be seen sooner for any reason.

## 2024-09-03 ENCOUNTER — APPOINTMENT (OUTPATIENT)
Dept: INTERNAL MEDICINE | Facility: OTHER | Age: 64
End: 2024-09-03
Payer: OTHER GOVERNMENT

## 2024-09-27 RX ORDER — LISINOPRIL 20 MG/1
20 TABLET ORAL DAILY
Qty: 90 TABLET | Refills: 1 | Status: SHIPPED | OUTPATIENT
Start: 2024-09-27

## 2024-10-03 DIAGNOSIS — R12 HEART BURN: ICD-10-CM

## 2024-10-04 ENCOUNTER — HOSPITAL ENCOUNTER (OUTPATIENT)
Dept: LAB | Facility: MEDICAL CENTER | Age: 64
End: 2024-10-04
Payer: OTHER GOVERNMENT

## 2024-10-04 ENCOUNTER — HOSPITAL ENCOUNTER (OUTPATIENT)
Dept: LAB | Facility: MEDICAL CENTER | Age: 64
End: 2024-10-04
Attending: HOSPITALIST
Payer: OTHER GOVERNMENT

## 2024-10-04 DIAGNOSIS — Z00.00 HEALTH CARE MAINTENANCE: ICD-10-CM

## 2024-10-04 DIAGNOSIS — I10 HYPERTENSION, UNSPECIFIED TYPE: ICD-10-CM

## 2024-10-04 LAB
ALBUMIN SERPL BCP-MCNC: 4.2 G/DL (ref 3.2–4.9)
ALBUMIN/GLOB SERPL: 1.3 G/DL
ALP SERPL-CCNC: 82 U/L (ref 30–99)
ALT SERPL-CCNC: 65 U/L (ref 2–50)
ANION GAP SERPL CALC-SCNC: 9 MMOL/L (ref 7–16)
AST SERPL-CCNC: 36 U/L (ref 12–45)
BASOPHILS # BLD AUTO: 1.4 % (ref 0–1.8)
BASOPHILS # BLD: 0.08 K/UL (ref 0–0.12)
BILIRUB SERPL-MCNC: 0.5 MG/DL (ref 0.1–1.5)
BUN SERPL-MCNC: 13 MG/DL (ref 8–22)
CALCIUM ALBUM COR SERPL-MCNC: 8.8 MG/DL (ref 8.5–10.5)
CALCIUM SERPL-MCNC: 9 MG/DL (ref 8.5–10.5)
CHLORIDE SERPL-SCNC: 106 MMOL/L (ref 96–112)
CO2 SERPL-SCNC: 25 MMOL/L (ref 20–33)
CREAT SERPL-MCNC: 1 MG/DL (ref 0.5–1.4)
EOSINOPHIL # BLD AUTO: 0.27 K/UL (ref 0–0.51)
EOSINOPHIL NFR BLD: 4.6 % (ref 0–6.9)
ERYTHROCYTE [DISTWIDTH] IN BLOOD BY AUTOMATED COUNT: 43.7 FL (ref 35.9–50)
EST. AVERAGE GLUCOSE BLD GHB EST-MCNC: 131 MG/DL
GFR SERPLBLD CREATININE-BSD FMLA CKD-EPI: 84 ML/MIN/1.73 M 2
GLOBULIN SER CALC-MCNC: 3.2 G/DL (ref 1.9–3.5)
GLUCOSE SERPL-MCNC: 97 MG/DL (ref 65–99)
HBA1C MFR BLD: 6.2 % (ref 4–5.6)
HCT VFR BLD AUTO: 46.4 % (ref 42–52)
HGB BLD-MCNC: 15.9 G/DL (ref 14–18)
IMM GRANULOCYTES # BLD AUTO: 0.03 K/UL (ref 0–0.11)
IMM GRANULOCYTES NFR BLD AUTO: 0.5 % (ref 0–0.9)
LYMPHOCYTES # BLD AUTO: 2.27 K/UL (ref 1–4.8)
LYMPHOCYTES NFR BLD: 38.8 % (ref 22–41)
MCH RBC QN AUTO: 33.1 PG (ref 27–33)
MCHC RBC AUTO-ENTMCNC: 34.3 G/DL (ref 32.3–36.5)
MCV RBC AUTO: 96.7 FL (ref 81.4–97.8)
MONOCYTES # BLD AUTO: 0.69 K/UL (ref 0–0.85)
MONOCYTES NFR BLD AUTO: 11.8 % (ref 0–13.4)
NEUTROPHILS # BLD AUTO: 2.51 K/UL (ref 1.82–7.42)
NEUTROPHILS NFR BLD: 42.9 % (ref 44–72)
NRBC # BLD AUTO: 0 K/UL
NRBC BLD-RTO: 0 /100 WBC (ref 0–0.2)
PLATELET # BLD AUTO: 223 K/UL (ref 164–446)
PMV BLD AUTO: 8.9 FL (ref 9–12.9)
POTASSIUM SERPL-SCNC: 4.3 MMOL/L (ref 3.6–5.5)
PROT SERPL-MCNC: 7.4 G/DL (ref 6–8.2)
RBC # BLD AUTO: 4.8 M/UL (ref 4.7–6.1)
SODIUM SERPL-SCNC: 140 MMOL/L (ref 135–145)
WBC # BLD AUTO: 5.9 K/UL (ref 4.8–10.8)

## 2024-10-04 PROCEDURE — 80053 COMPREHEN METABOLIC PANEL: CPT

## 2024-10-04 PROCEDURE — 83036 HEMOGLOBIN GLYCOSYLATED A1C: CPT

## 2024-10-04 PROCEDURE — 36415 COLL VENOUS BLD VENIPUNCTURE: CPT

## 2024-10-04 PROCEDURE — 85025 COMPLETE CBC W/AUTO DIFF WBC: CPT

## 2024-10-08 DIAGNOSIS — I25.10 ASCVD (ARTERIOSCLEROTIC CARDIOVASCULAR DISEASE): ICD-10-CM

## 2024-10-09 ENCOUNTER — TELEPHONE (OUTPATIENT)
Dept: INTERNAL MEDICINE | Facility: OTHER | Age: 64
End: 2024-10-09
Payer: OTHER GOVERNMENT

## 2024-10-09 DIAGNOSIS — R73.03 PREDIABETES: ICD-10-CM

## 2024-10-09 RX ORDER — ATORVASTATIN CALCIUM 80 MG/1
80 TABLET, FILM COATED ORAL EVERY EVENING
Qty: 90 TABLET | Refills: 1 | Status: SHIPPED | OUTPATIENT
Start: 2024-10-09

## 2024-10-14 ENCOUNTER — TELEPHONE (OUTPATIENT)
Dept: INTERNAL MEDICINE | Facility: OTHER | Age: 64
End: 2024-10-14
Payer: OTHER GOVERNMENT

## 2024-12-02 ENCOUNTER — PATIENT MESSAGE (OUTPATIENT)
Dept: HEALTH INFORMATION MANAGEMENT | Facility: OTHER | Age: 64
End: 2024-12-02

## 2025-01-08 ENCOUNTER — OFFICE VISIT (OUTPATIENT)
Dept: INTERNAL MEDICINE | Facility: OTHER | Age: 65
End: 2025-01-08
Payer: OTHER GOVERNMENT

## 2025-01-08 VITALS
SYSTOLIC BLOOD PRESSURE: 123 MMHG | TEMPERATURE: 97.9 F | HEART RATE: 76 BPM | HEIGHT: 65 IN | WEIGHT: 157.2 LBS | OXYGEN SATURATION: 98 % | BODY MASS INDEX: 26.19 KG/M2 | DIASTOLIC BLOOD PRESSURE: 75 MMHG

## 2025-01-08 DIAGNOSIS — R73.03 PREDIABETES: ICD-10-CM

## 2025-01-08 DIAGNOSIS — F10.10 ALCOHOL ABUSE: ICD-10-CM

## 2025-01-08 DIAGNOSIS — I10 ESSENTIAL HYPERTENSION: ICD-10-CM

## 2025-01-08 DIAGNOSIS — F17.219 CIGARETTE NICOTINE DEPENDENCE WITH NICOTINE-INDUCED DISORDER: ICD-10-CM

## 2025-01-08 DIAGNOSIS — R12 HEART BURN: ICD-10-CM

## 2025-01-08 DIAGNOSIS — R07.89 OTHER CHEST PAIN: ICD-10-CM

## 2025-01-08 DIAGNOSIS — R74.01 ALT (SGPT) LEVEL RAISED: ICD-10-CM

## 2025-01-08 DIAGNOSIS — E66.3 OVERWEIGHT (BMI 25.0-29.9): ICD-10-CM

## 2025-01-08 PROBLEM — K63.5 POLYP OF COLON: Status: RESOLVED | Noted: 2024-03-19 | Resolved: 2025-01-08

## 2025-01-08 PROCEDURE — 99214 OFFICE O/P EST MOD 30 MIN: CPT | Mod: GC

## 2025-01-08 PROCEDURE — 3074F SYST BP LT 130 MM HG: CPT | Mod: GC

## 2025-01-08 PROCEDURE — 3078F DIAST BP <80 MM HG: CPT | Mod: GC

## 2025-01-08 ASSESSMENT — ENCOUNTER SYMPTOMS
SORE THROAT: 0
SPUTUM PRODUCTION: 0
HEARTBURN: 0
PALPITATIONS: 0
BACK PAIN: 0
ABDOMINAL PAIN: 0
HEADACHES: 0
DIZZINESS: 0
NAUSEA: 0
NERVOUS/ANXIOUS: 0
SHORTNESS OF BREATH: 0
COUGH: 0
VOMITING: 0
FEVER: 0
CHILLS: 0
SINUS PAIN: 0

## 2025-01-08 ASSESSMENT — FIBROSIS 4 INDEX: FIB4 SCORE: 1.28

## 2025-01-08 ASSESSMENT — PATIENT HEALTH QUESTIONNAIRE - PHQ9: CLINICAL INTERPRETATION OF PHQ2 SCORE: 0

## 2025-01-08 NOTE — PROGRESS NOTES
Established Patient    Patient Care Team:  Lilo Boles M.D. as PCP - General (Internal Medicine)    HPI:  Carlos Alberto Gary is a 64 y.o. male with relevant past medical history of Hypertension, prediabetes, BPH, BPPV, colon polyps on colonoscopy, lung nodule (3mm RML 4/24/24), alcohol abuse and nicotine dependence  who presents today for follow up    Mr Carlos Alberto denies any recent illnesses or hospitalizations. He states he is feeling good, except for occasional pain in the left upper chest, which is chronic, usually occurs at rest. Occasionally radiating upwards to the left shoulder. It gets relieved with lidocaine patches. However, have not noticed any correlation with activity or stress levels. Denies  any associated palpitations/shortness of breath or diaphoresis or lightheadedness or dizziness.    Patient is very concerned about his labs, which were done in Oct, 24 were all wnl, except for isolated elevation in ALT, prediabetic A1c. He reports he walks daily for atleast an hour. Tries to eat healthy diet. He also checks his BP at home with a wrist cuff machine, which are in the ranges of 110 - 138 over 75-90. He is taking lisinopril 20mg daily. He's been taking omeprazole for heartburn and is currently symptom-free. However, he's hesitant to stop taking the medication due to concerns that his heartburn will return. He is due for low dose CT for monitoring right lung nodule in the month of April    The 10-year ASCVD risk score (Ap GUERRERO, et al., 2019) is: 15.9%    Values used to calculate the score:      Age: 64 years      Sex: Male      Is Non- : No      Diabetic: No      Tobacco smoker: Yes      Systolic Blood Pressure: 123 mmHg      Is BP treated: Yes      HDL Cholesterol: 52 mg/dL      Total Cholesterol: 172 mg/dL      Review of Systems   Constitutional:  Negative for chills and fever.   HENT:  Negative for congestion, sinus pain and sore throat.    Respiratory:  Negative  "for cough, sputum production and shortness of breath.    Cardiovascular:  Positive for chest pain (reproducible pain on the left upper chest, chronic,  relieved with lidocaine patches). Negative for palpitations and leg swelling.   Gastrointestinal:  Negative for abdominal pain, heartburn, nausea and vomiting.   Genitourinary:  Negative for dysuria and urgency.   Musculoskeletal:  Negative for back pain and joint pain.   Neurological:  Negative for dizziness and headaches.   Psychiatric/Behavioral:  The patient is not nervous/anxious.    :    Past Medical History:   Diagnosis Date    Hypertension     Smoker        Social History     Tobacco Use    Smoking status: Every Day     Current packs/day: 0.50     Average packs/day: 1 pack/day for 40.0 years (39.0 ttl pk-yrs)     Types: Cigarettes     Start date: 1985    Smokeless tobacco: Never    Tobacco comments:     Now, patient smokes 6 cigarettes daily     01/08/25 - 5 cigarettes daily   Vaping Use    Vaping status: Never Used   Substance Use Topics    Alcohol use: Yes     Alcohol/week: 16.8 oz     Types: 28 Cans of beer per week     Comment: 1-2 drinks per day    Drug use: Never       Current Outpatient Medications   Medication Sig Dispense Refill    atorvastatin (LIPITOR) 80 MG tablet Take 1 Tablet by mouth every evening. 90 Tablet 1    omeprazole (PRILOSEC) 20 MG delayed-release capsule Take 1 Capsule by mouth every day. 90 Capsule 1    lisinopril (PRINIVIL) 20 MG Tab Take 1 Tablet by mouth every day. 90 Tablet 1    nicotine (NICODERM) 14 MG/24HR PATCH 24 HR Place 1 Patch on the skin every 24 hours. 30 Patch 2    tamsulosin (FLOMAX) 0.4 MG capsule Take 1 Capsule by mouth 1/2 hour after breakfast. 90 Capsule 1     No current facility-administered medications for this visit.       /75 (BP Location: Right arm, Patient Position: Sitting, BP Cuff Size: Adult)   Pulse 76   Temp 36.6 °C (97.9 °F) (Temporal)   Ht 1.651 m (5' 5\")   Wt 71.3 kg (157 lb 3.2 oz)   SpO2 " 98%   BMI 26.16 kg/m²   Physical Exam  Vitals reviewed.   Constitutional:       General: He is not in acute distress.     Appearance: Normal appearance. He is not ill-appearing.   HENT:      Head: Normocephalic.      Nose: Nose normal.      Mouth/Throat:      Mouth: Mucous membranes are moist.   Eyes:      Pupils: Pupils are equal, round, and reactive to light.   Cardiovascular:      Rate and Rhythm: Normal rate and regular rhythm.      Pulses: Normal pulses.      Heart sounds: Normal heart sounds.   Pulmonary:      Effort: Pulmonary effort is normal. No respiratory distress.      Breath sounds: Normal breath sounds.   Chest:      Chest wall: Tenderness present.       Abdominal:      General: Bowel sounds are normal. There is no distension.      Tenderness: There is no abdominal tenderness.   Musculoskeletal:         General: Normal range of motion.      Cervical back: Normal range of motion.      Right lower leg: No edema.      Left lower leg: No edema.   Skin:     General: Skin is warm.      Capillary Refill: Capillary refill takes less than 2 seconds.   Neurological:      General: No focal deficit present.      Mental Status: He is alert and oriented to person, place, and time.      Sensory: No sensory deficit.      Motor: No weakness.   Psychiatric:         Mood and Affect: Mood normal.         Behavior: Behavior is cooperative.           Assessment and Plan:     # Chronic chest pain  -Chronic chest pain on the left upper chest for last several months, dull like pain, usually noticed at rest but not with exertion or activity. Relieved with lidocaine patches.   - Not associated with shortness of breath or palpitations or diaphoresis or lightheadedness or dizziness or N/V.  -Advised to closely monitor the timing, onset, duration and other exaggerating or relieving factors. Also advised to go to ED, if develop sudden, severe worsening chest pain/discomfort.     # Overweight (BMI 25.0-29.9).    -Weight 71.3  (157lb), BMI 26.16  -Advised to continue daily exercises and may add in strength training to it.  -Recommended on low sodium diet, high fiber with adequate hydration.   -Advised to continue checking BP at home, with an arm cuff instead of wrist one. -Recommended to log the reading and bring back both the log and machine for the next visit for calibration with office cuff.  -Advised to continue atorvastatin daily along with BP medications.  -Rechecking labs    - Comp Metabolic Panel; Future  - Lipid Profile; Future    # Prediabetes  -Last A1c 6.2%  -We discussed the importance of lifestyle modifications and eliminating risk factors to manage prediabetes to prevent progression to T2DM.  -Highly recommended on smoking cessation and alcohol consumption.  -Rechecking labs before the next visit.  -encouraged on daily exercises, engaging in more physical activities and adopting a balanced diet with low sodium and high fiber content.    - HEMOGLOBIN A1C; Future  - Lipid Profile; Future    # Cigarette nicotine dependence with nicotine-induced disorder  -Currently smoking 3 cigarettes per day  -Discussed the importance of smoking cessation with all the comorbidities like HTN, prediabetes, overweight and Hx of lung nodule.   -Patient reports issues with nicotine patches not being covered by his insurance, provided information to get it at low cost at [employer] or [employer].  We discussed on other treatment modalities as well like other medications or CBT, patient denied them for now. Needs f/u on it at the next visit.  -Recommended to use nicotine patches and nicotine gums together for better results.    # Essential hypertension  /75, on lisinopril 20 mg daily  -Recommended to continue checking blood pressure at home regularly and bringing back the log and machine for calibration to the next visit  -Highlighted the importance of regular exercise, low salt diet, stress management and smoking cessation.  -Advised to continue  taking Lisinopril 20mg daily.  -Rechecking labs     - Comp Metabolic Panel; Future  - Lipid Profile; Future    # ALT (SGPT) level raised  Recent ALT level 65 (from 70).   On Atorvastatin 80mg for ASCVD risk of 15.9% (s/s age, smoking, HTN).  -advised to continue atorvastatin and limit alcohol intake to less than 2 drinks/day  - Comp Metabolic Panel; Future    #Alcohol abuse  -Drinks 3glasses per day.  -Advised to limit alcohol consumption to 1-2 drinks per day to reduce risk of complications.    # Heart burn  - Currently symptom free, on omeprazole.  - Advised on conservative measures like avoiding late night/heavy meals atleast 3hr prior to sleep, avoid NSAIDs, have adequate hydration. Elevate head of the bed.  - Recommended to stop the medication, since patient is symptom free. Patient is hesitant to stop, but stated will stop for a week and observe closely while following above precautions.        Return in about 4 months (around 5/8/2025). For follow up on labs, CT chest and Omeprazole      Lilo Boles MD  PGY-1 Internal Medicine  [facility]    This note was created using voice recognition software.  While every attempt is made to ensure accuracy of transcription, occasionally errors occur.

## 2025-01-09 ENCOUNTER — TELEPHONE (OUTPATIENT)
Dept: HEALTH INFORMATION MANAGEMENT | Facility: OTHER | Age: 65
End: 2025-01-09
Payer: OTHER GOVERNMENT

## 2025-01-09 NOTE — PATIENT INSTRUCTIONS
Thank you for coming in today, please follow-up within Return in about 4 months (around 5/8/2025).  Please get Flu, COVID and shingles vaccines at your pharmacy  Please get labs drawn in ~1 wk prior to next visit ( fasting needed)  Please get nicotine patches and gums for smoking cessation  Please limit alcohol intake (<2 glasses/day) and have adequate hydration.  We advise on regular exercise and healthy balanced diet with low sodium intake.    Please call our clinic if you have any future questions or concerns, or If you'd like to be seen sooner for any reason.

## 2025-04-01 ENCOUNTER — HOSPITAL ENCOUNTER (OUTPATIENT)
Dept: LAB | Facility: MEDICAL CENTER | Age: 65
End: 2025-04-01
Payer: OTHER GOVERNMENT

## 2025-04-01 DIAGNOSIS — R73.03 PREDIABETES: ICD-10-CM

## 2025-04-01 DIAGNOSIS — R74.01 ALT (SGPT) LEVEL RAISED: ICD-10-CM

## 2025-04-01 DIAGNOSIS — F10.10 ALCOHOL ABUSE: ICD-10-CM

## 2025-04-01 DIAGNOSIS — I10 ESSENTIAL HYPERTENSION: ICD-10-CM

## 2025-04-01 DIAGNOSIS — E66.3 OVERWEIGHT (BMI 25.0-29.9): ICD-10-CM

## 2025-04-01 LAB
ALBUMIN SERPL BCP-MCNC: 4.4 G/DL (ref 3.2–4.9)
ALBUMIN/GLOB SERPL: 1.3 G/DL
ALP SERPL-CCNC: 76 U/L (ref 30–99)
ALT SERPL-CCNC: 97 U/L (ref 2–50)
ANION GAP SERPL CALC-SCNC: 13 MMOL/L (ref 7–16)
AST SERPL-CCNC: 47 U/L (ref 12–45)
BILIRUB SERPL-MCNC: 0.7 MG/DL (ref 0.1–1.5)
BUN SERPL-MCNC: 14 MG/DL (ref 8–22)
CALCIUM ALBUM COR SERPL-MCNC: 9.3 MG/DL (ref 8.5–10.5)
CALCIUM SERPL-MCNC: 9.6 MG/DL (ref 8.5–10.5)
CHLORIDE SERPL-SCNC: 102 MMOL/L (ref 96–112)
CHOLEST SERPL-MCNC: 105 MG/DL (ref 100–199)
CO2 SERPL-SCNC: 22 MMOL/L (ref 20–33)
CREAT SERPL-MCNC: 1.03 MG/DL (ref 0.5–1.4)
EST. AVERAGE GLUCOSE BLD GHB EST-MCNC: 137 MG/DL
FASTING STATUS PATIENT QL REPORTED: NORMAL
GFR SERPLBLD CREATININE-BSD FMLA CKD-EPI: 81 ML/MIN/1.73 M 2
GLOBULIN SER CALC-MCNC: 3.4 G/DL (ref 1.9–3.5)
GLUCOSE SERPL-MCNC: 95 MG/DL (ref 65–99)
HBA1C MFR BLD: 6.4 % (ref 4–5.6)
HDLC SERPL-MCNC: 42 MG/DL
LDLC SERPL CALC-MCNC: 45 MG/DL
POTASSIUM SERPL-SCNC: 4.4 MMOL/L (ref 3.6–5.5)
PROT SERPL-MCNC: 7.8 G/DL (ref 6–8.2)
SODIUM SERPL-SCNC: 137 MMOL/L (ref 135–145)
TRIGL SERPL-MCNC: 92 MG/DL (ref 0–149)

## 2025-04-01 PROCEDURE — 80053 COMPREHEN METABOLIC PANEL: CPT

## 2025-04-01 PROCEDURE — 83036 HEMOGLOBIN GLYCOSYLATED A1C: CPT

## 2025-04-01 PROCEDURE — 36415 COLL VENOUS BLD VENIPUNCTURE: CPT

## 2025-04-01 PROCEDURE — 80061 LIPID PANEL: CPT

## 2025-04-03 RX ORDER — LISINOPRIL 20 MG/1
20 TABLET ORAL DAILY
Qty: 90 TABLET | Refills: 1 | Status: SHIPPED | OUTPATIENT
Start: 2025-04-03

## 2025-04-14 ENCOUNTER — RESULTS FOLLOW-UP (OUTPATIENT)
Dept: INTERNAL MEDICINE | Facility: OTHER | Age: 65
End: 2025-04-14
Payer: OTHER GOVERNMENT

## 2025-04-14 DIAGNOSIS — R74.01 ELEVATED LIVER TRANSAMINASE LEVEL: ICD-10-CM

## 2025-04-14 RX ORDER — ATORVASTATIN CALCIUM 40 MG/1
40 TABLET, FILM COATED ORAL NIGHTLY
Qty: 100 TABLET | Refills: 3 | Status: SHIPPED | OUTPATIENT
Start: 2025-04-14 | End: 2026-05-19

## 2025-04-25 ENCOUNTER — HOSPITAL ENCOUNTER (OUTPATIENT)
Dept: RADIOLOGY | Facility: MEDICAL CENTER | Age: 65
End: 2025-04-25
Payer: OTHER GOVERNMENT

## 2025-04-25 DIAGNOSIS — R91.1 LUNG NODULE: ICD-10-CM

## 2025-04-25 DIAGNOSIS — F17.210 CIGARETTE NICOTINE DEPENDENCE WITHOUT COMPLICATION: ICD-10-CM

## 2025-04-25 PROCEDURE — 71271 CT THORAX LUNG CANCER SCR C-: CPT

## 2025-04-27 ENCOUNTER — RESULTS FOLLOW-UP (OUTPATIENT)
Dept: INTERNAL MEDICINE | Facility: OTHER | Age: 65
End: 2025-04-27

## 2025-05-02 ENCOUNTER — HOSPITAL ENCOUNTER (OUTPATIENT)
Dept: LAB | Facility: MEDICAL CENTER | Age: 65
End: 2025-05-02
Payer: OTHER GOVERNMENT

## 2025-05-02 DIAGNOSIS — R74.01 ELEVATED LIVER TRANSAMINASE LEVEL: ICD-10-CM

## 2025-05-02 LAB
ALBUMIN SERPL BCP-MCNC: 4.3 G/DL (ref 3.2–4.9)
ALBUMIN/GLOB SERPL: 1.2 G/DL
ALP SERPL-CCNC: 85 U/L (ref 30–99)
ALT SERPL-CCNC: 75 U/L (ref 2–50)
ANION GAP SERPL CALC-SCNC: 10 MMOL/L (ref 7–16)
AST SERPL-CCNC: 35 U/L (ref 12–45)
BILIRUB SERPL-MCNC: 0.5 MG/DL (ref 0.1–1.5)
BUN SERPL-MCNC: 16 MG/DL (ref 8–22)
CALCIUM ALBUM COR SERPL-MCNC: 9 MG/DL (ref 8.5–10.5)
CALCIUM SERPL-MCNC: 9.2 MG/DL (ref 8.5–10.5)
CHLORIDE SERPL-SCNC: 105 MMOL/L (ref 96–112)
CO2 SERPL-SCNC: 24 MMOL/L (ref 20–33)
CREAT SERPL-MCNC: 1.02 MG/DL (ref 0.5–1.4)
GFR SERPLBLD CREATININE-BSD FMLA CKD-EPI: 82 ML/MIN/1.73 M 2
GLOBULIN SER CALC-MCNC: 3.5 G/DL (ref 1.9–3.5)
GLUCOSE SERPL-MCNC: 106 MG/DL (ref 65–99)
POTASSIUM SERPL-SCNC: 4.2 MMOL/L (ref 3.6–5.5)
PROT SERPL-MCNC: 7.8 G/DL (ref 6–8.2)
SODIUM SERPL-SCNC: 139 MMOL/L (ref 135–145)

## 2025-05-02 PROCEDURE — 80053 COMPREHEN METABOLIC PANEL: CPT

## 2025-05-02 PROCEDURE — 36415 COLL VENOUS BLD VENIPUNCTURE: CPT

## 2025-05-09 ENCOUNTER — OFFICE VISIT (OUTPATIENT)
Dept: INTERNAL MEDICINE | Facility: OTHER | Age: 65
End: 2025-05-09
Payer: OTHER GOVERNMENT

## 2025-05-09 VITALS
OXYGEN SATURATION: 97 % | HEIGHT: 65 IN | HEART RATE: 72 BPM | TEMPERATURE: 98.2 F | DIASTOLIC BLOOD PRESSURE: 75 MMHG | SYSTOLIC BLOOD PRESSURE: 115 MMHG | BODY MASS INDEX: 26.42 KG/M2 | WEIGHT: 158.6 LBS

## 2025-05-09 DIAGNOSIS — R73.03 PREDIABETES: ICD-10-CM

## 2025-05-09 DIAGNOSIS — R74.01 ELEVATED LIVER TRANSAMINASE LEVEL: ICD-10-CM

## 2025-05-09 DIAGNOSIS — I10 HYPERTENSION, UNSPECIFIED TYPE: ICD-10-CM

## 2025-05-09 DIAGNOSIS — F17.211 CIGARETTE NICOTINE DEPENDENCE IN REMISSION: ICD-10-CM

## 2025-05-09 DIAGNOSIS — N40.0 BENIGN PROSTATIC HYPERPLASIA WITHOUT LOWER URINARY TRACT SYMPTOMS: ICD-10-CM

## 2025-05-09 DIAGNOSIS — R91.1 LUNG NODULE: ICD-10-CM

## 2025-05-09 DIAGNOSIS — F10.10 ALCOHOL ABUSE: ICD-10-CM

## 2025-05-09 PROBLEM — R12 HEART BURN: Status: RESOLVED | Noted: 2024-08-28 | Resolved: 2025-05-09

## 2025-05-09 PROBLEM — D12.6 ADENOMATOUS POLYP OF COLON: Status: ACTIVE | Noted: 2024-03-19

## 2025-05-09 PROBLEM — H81.10 BENIGN PAROXYSMAL POSITIONAL VERTIGO: Status: RESOLVED | Noted: 2023-11-20 | Resolved: 2025-05-09

## 2025-05-09 PROBLEM — H25.813 COMBINED FORMS OF AGE-RELATED CATARACT OF BOTH EYES: Status: ACTIVE | Noted: 2025-01-28

## 2025-05-09 PROBLEM — G47.00 INSOMNIA: Status: RESOLVED | Noted: 2023-11-20 | Resolved: 2025-05-09

## 2025-05-09 PROBLEM — K21.9 GASTROESOPHAGEAL REFLUX DISEASE: Status: ACTIVE | Noted: 2025-02-27

## 2025-05-09 PROBLEM — K21.9 GASTROESOPHAGEAL REFLUX DISEASE: Status: RESOLVED | Noted: 2025-02-27 | Resolved: 2025-05-09

## 2025-05-09 PROBLEM — F17.201 NICOTINE DEPENDENCE IN REMISSION: Status: ACTIVE | Noted: 2023-03-28

## 2025-05-09 PROBLEM — Z86.0100 HISTORY OF COLONIC POLYPS: Status: ACTIVE | Noted: 2024-03-19

## 2025-05-09 PROBLEM — M75.20 BICEPS TENDINITIS: Status: RESOLVED | Noted: 2025-05-09 | Resolved: 2025-05-09

## 2025-05-09 PROBLEM — H40.003 OPEN-ANGLE GLAUCOMA SUSPECT OF BOTH EYES: Status: RESOLVED | Noted: 2025-01-28 | Resolved: 2025-05-09

## 2025-05-09 PROBLEM — R06.2 WHEEZING: Status: RESOLVED | Noted: 2024-06-18 | Resolved: 2025-05-09

## 2025-05-09 PROBLEM — Z96.1 PSEUDOPHAKIA OF BOTH EYES: Status: RESOLVED | Noted: 2025-03-19 | Resolved: 2025-05-09

## 2025-05-09 PROCEDURE — 3074F SYST BP LT 130 MM HG: CPT | Mod: GE

## 2025-05-09 PROCEDURE — 3078F DIAST BP <80 MM HG: CPT | Mod: GE

## 2025-05-09 PROCEDURE — 99213 OFFICE O/P EST LOW 20 MIN: CPT | Mod: GE

## 2025-05-09 RX ORDER — TAMSULOSIN HYDROCHLORIDE 0.4 MG/1
0.4 CAPSULE ORAL
Qty: 90 CAPSULE | Refills: 1 | Status: SHIPPED | OUTPATIENT
Start: 2025-05-09

## 2025-05-09 RX ORDER — ATORVASTATIN CALCIUM 40 MG/1
40 TABLET, FILM COATED ORAL NIGHTLY
Qty: 100 TABLET | Refills: 3 | Status: CANCELLED | OUTPATIENT
Start: 2025-05-09 | End: 2026-06-13

## 2025-05-09 RX ORDER — LISINOPRIL 20 MG/1
20 TABLET ORAL DAILY
Qty: 90 TABLET | Refills: 1 | Status: SHIPPED | OUTPATIENT
Start: 2025-05-09

## 2025-05-09 ASSESSMENT — ENCOUNTER SYMPTOMS
VOMITING: 0
FEVER: 0
ABDOMINAL PAIN: 0
DIZZINESS: 0
CHILLS: 0
NERVOUS/ANXIOUS: 0
SORE THROAT: 0
BACK PAIN: 0
COUGH: 0
HEARTBURN: 0
SINUS PAIN: 0
HEADACHES: 0
SHORTNESS OF BREATH: 0
NAUSEA: 0
PALPITATIONS: 0
SPUTUM PRODUCTION: 0

## 2025-05-09 ASSESSMENT — FIBROSIS 4 INDEX: FIB4 SCORE: 1.16

## 2025-05-09 NOTE — PATIENT INSTRUCTIONS
Thank you for coming in today, please follow-up within Return in about 4 months (around 9/9/2025).  -Please get shingles vaccine at your nearby pharmacy.  -Please stop omeprazole for heartburn and atorvastatin for liver enzymes.   -please get the labs drawn in two weeks. (Fasting required)  Please call our clinic if you have any future questions or concerns, or If you'd like to be seen sooner for any reason.

## 2025-05-09 NOTE — PROGRESS NOTES
Established Patient    Patient Care Team:  Lilo Boles M.D. as PCP - General (Internal Medicine)    HPI:  Carlos Alberto Gary is a 64 y.o. male with relevant past medical history of Hypertension, prediabetes, BPH, BPPV, colon polyps on colonoscopy, lung nodule (3mm RML 4/24/24), alcohol abuse and nicotine dependence  who presents today for regular follow up.    Mr Carlos Alberto states feeling good. No recent illnesses or hospitalizations. Patient recently under went bilateral cataract surgeries of both eyes, since then has quit both smoking and drinking alcohol. Denies any other acute sxs, no abdominal pain or nausea or vomiting or bloody or black stools or constipation or diarrhea or LUTS.     #elevated liver enzymes:  #Hx of alcohol use  CMP showed elevated AST and ALT levels, which thought likely secondary to alcohol use and statin use, Patient was advised to decrease statin use to 40mg daily from 80 mg. Repeat testing showed drop in ALT levels (still elevated at 75) and AST to normal limits. Can be attributed to decreased statin use and with cessation of alcohol for last two months.    #Lung nodule  #Hx of smoking  Patient was found to have lung nodule in right middle lobe on LDCT in 2024, repeat screening showed stable 3 mm lung nodule.   Patient also reports quitting smoking cigarette's since March 2025. Reports that he was controlling his cravings by keeping his hands busy,    Denies any cough or shortness of breath or unintended weight loss    #Hypertension   Well controlled on lisinopril  CMP within normal limits, except for mildly elevated ALT.    #Prediabetes  Recent A1c 6.4%  Weight has been stable, lipids well controlled on statin.   Denies any polyuria or polydipsia.  No known FHx of diabetes    Patient requesting refill of his medications, as he was going on a trip to [place] for next 3 months starting from end of May.    Preventative health measures:  Colonoscopy: due in 2027  Flu shot:  uptodate  Pneumococcal vaccine: up to date  4.   Lung cancer screening: due in 2026    Review of Systems   Constitutional:  Negative for chills and fever.   HENT:  Negative for congestion, sinus pain and sore throat.    Respiratory:  Negative for cough, sputum production and shortness of breath.    Cardiovascular:  Negative for chest pain, palpitations and leg swelling.   Gastrointestinal:  Negative for abdominal pain, heartburn, nausea and vomiting.   Genitourinary:  Negative for dysuria and urgency.   Musculoskeletal:  Negative for back pain and joint pain.   Neurological:  Negative for dizziness and headaches.   Psychiatric/Behavioral:  The patient is not nervous/anxious.    :    Past Medical History:   Diagnosis Date    Benign paroxysmal positional vertigo 11/20/2023    Biceps tendinitis 05/09/2025    Under control but will be a recurring problem; patient to continue exercise as instructed by PT.      Gastroesophageal reflux disease 02/27/2025    Heart burn 08/28/2024    Hypertension     Insomnia 11/20/2023    Open-angle glaucoma suspect of both eyes 01/28/2025    Pseudophakia of both eyes 03/19/2025    Smoker     Wheezing 06/18/2024    Intermittent  Some wheezing with walking and shortness of breath             Social History     Tobacco Use    Smoking status: Former     Current packs/day: 0.50     Average packs/day: 1 pack/day for 40.4 years (39.2 ttl pk-yrs)     Types: Cigarettes     Start date: 1985    Smokeless tobacco: Former    Tobacco comments:     Now, patient smokes 6 cigarettes daily     01/08/25 - 5 cigarettes daily   Vaping Use    Vaping status: Never Used   Substance Use Topics    Alcohol use: Not Currently     Alcohol/week: 16.8 oz     Types: 28 Cans of beer per week     Comment: 1-2 drinks per day    Drug use: Never       Current Outpatient Medications   Medication Sig Dispense Refill    tamsulosin (FLOMAX) 0.4 MG capsule Take 1 Capsule by mouth 1/2 hour after breakfast. 90 Capsule 1     "lisinopril (PRINIVIL) 20 MG Tab Take 1 Tablet by mouth every day. 90 Tablet 1     No current facility-administered medications for this visit.       /75 (BP Location: Left arm, Patient Position: Sitting, BP Cuff Size: Adult)   Pulse 72   Temp 36.8 °C (98.2 °F) (Temporal)   Ht 1.651 m (5' 5\")   Wt 71.9 kg (158 lb 9.6 oz)   SpO2 97%   BMI 26.39 kg/m²   Physical Exam  Vitals reviewed.   Constitutional:       General: He is not in acute distress.     Appearance: Normal appearance. He is not ill-appearing.   HENT:      Head: Normocephalic.      Nose: Nose normal.      Mouth/Throat:      Mouth: Mucous membranes are moist.   Eyes:      Pupils: Pupils are equal, round, and reactive to light.   Cardiovascular:      Rate and Rhythm: Normal rate and regular rhythm.      Pulses: Normal pulses.      Heart sounds: Normal heart sounds.   Pulmonary:      Effort: Pulmonary effort is normal. No respiratory distress.      Breath sounds: Normal breath sounds.   Abdominal:      General: Bowel sounds are normal. There is no distension.      Tenderness: There is no abdominal tenderness.   Musculoskeletal:         General: Normal range of motion.      Cervical back: Normal range of motion.      Right lower leg: No edema.      Left lower leg: No edema.   Skin:     General: Skin is warm.      Capillary Refill: Capillary refill takes less than 2 seconds.   Neurological:      General: No focal deficit present.      Mental Status: He is alert and oriented to person, place, and time.      Sensory: No sensory deficit.      Motor: No weakness.   Psychiatric:         Mood and Affect: Mood normal.         Behavior: Behavior is cooperative.           Assessment and Plan:     1. Elevated liver transaminase level  2. Hx of alcohol abuse  Elevated ALT levels, even in setting of alcohol cessation and decreased statin use.   Statins were started due to  high ASCVD risk as primary prevention. Had no prior history of MI/stroke. Hence recommended " to stop statin and repeat labs in two weeks.   Checking for any indolent hepatitis infection.  Denies any wild mushroom ingestion. Does not use tylenol or other pain medications at all, only uses lidocaine patches if needed.   If infection was ruled out and still has elevated liver enzymes, will get RUQ ultrasound with Fibro scan to test for fatty liver.   -encouraged to continue his commitment to staying alcohol-free, if needed help with cravings, he would let us know.   -advised to limit tylenol use to less than 2 g per day, if needed for pain.   - Comp Metabolic Panel; Future  - HEPATITIS PANEL ACUTE(4 COMPONENTS); Future    3. Cigarette nicotine dependence in remission  4. Lung nodule  Stable 3 mm nodule in right middle lobe is appreciated again in recent CT.   Patient quit smoking since March, 2025. Reports not having much cravings and is not interested in starting any medications/NRT for now, will let us know if needed.   - needs follow up with regular Chest CT for next annual check up.    5. Prediabetes  Recent A1c 6.4% at borderline.   -advised on lifestyle modifications.  -appreciated his commitment and decision to quit smoking and drinking alcohol.    6. Hypertension, unspecified type  Well controlled on lisinopril. Refilling medications for 3 months in advance for upcoming trip  - lisinopril (PRINIVIL) 20 MG Tab; Take 1 Tablet by mouth every day.  Dispense: 90 Tablet; Refill: 1    7. Benign prostatic hyperplasia without lower urinary tract symptoms  Refilling medication in advance for upcoming trip to [place].   - tamsulosin (FLOMAX) 0.4 MG capsule; Take 1 Capsule by mouth 1/2 hour after breakfast.  Dispense: 90 Capsule; Refill: 1       Return in about 4 months (around 9/9/2025).after returning back from his vacation. Ideally recommended follow up in a month or after labs, but patient has an upcoming trip in 3 weeks.      Lilo Boles MD  PGY-1 Internal Medicine  [facility]  Medicine    This note was created using voice recognition software.  While every attempt is made to ensure accuracy of transcription, occasionally errors occur.

## 2025-05-09 NOTE — PROGRESS NOTES
.Teaching Physician Attestation      Level of Participation    I discussed with the resident physician the patient's history, exam, assessment and plan in detail.  Topics listed in my addendum were the focus of the visit.  Healthcare maintenance was not addressed this visit unless listed as a topic in my addendum.  I agree with plan as written along with the following additions/modifications:      Mild Tranaminitis in setting alcohol use, statin use, elevated BMI, needs further evaluation  -Praised patient for alcohol cessation, trial stopping statin (on for primary prevention) and follow-up CMP in approximately 1 week, if still elevated transaminases off statin and off alcohol, would proceed with right upper quadrant ultrasound and FibroSure.  Limit Tylenol to 2 g daily in interim, denies wild mushroom ingestion, check hepatitis serologies.  Ideally follow-up in 1 month, follow-up in 3 months however once returns as we will be out of town    Lung nodule  -quit smoking, praised  pt, offered to help with any cravings if you would like  -LDCT- stable 3mm RML nodule, lung RADS 2-benign appearing, follow-up low-dose CT in 1 year      Refill flomax and lisinoprili (bp at goal in office today), denies lightheadedness, chest pain, sob today.  Feels well/at baseline per report.  recent CMP with stable creatinine and potassium.  Follow-up when returns from 3-month trip.  pcr

## 2025-05-21 ENCOUNTER — HOSPITAL ENCOUNTER (OUTPATIENT)
Dept: LAB | Facility: MEDICAL CENTER | Age: 65
End: 2025-05-21
Payer: OTHER GOVERNMENT

## 2025-05-21 DIAGNOSIS — R74.01 ELEVATED LIVER TRANSAMINASE LEVEL: ICD-10-CM

## 2025-05-21 PROCEDURE — 80053 COMPREHEN METABOLIC PANEL: CPT

## 2025-05-21 PROCEDURE — 36415 COLL VENOUS BLD VENIPUNCTURE: CPT

## 2025-05-21 PROCEDURE — 80074 ACUTE HEPATITIS PANEL: CPT

## 2025-05-22 LAB
ALBUMIN SERPL BCP-MCNC: 4 G/DL (ref 3.2–4.9)
ALBUMIN/GLOB SERPL: 1.2 G/DL
ALP SERPL-CCNC: 85 U/L (ref 30–99)
ALT SERPL-CCNC: 53 U/L (ref 2–50)
ANION GAP SERPL CALC-SCNC: 10 MMOL/L (ref 7–16)
AST SERPL-CCNC: 38 U/L (ref 12–45)
BILIRUB SERPL-MCNC: 0.3 MG/DL (ref 0.1–1.5)
BUN SERPL-MCNC: 10 MG/DL (ref 8–22)
CALCIUM ALBUM COR SERPL-MCNC: 9.1 MG/DL (ref 8.5–10.5)
CALCIUM SERPL-MCNC: 9.1 MG/DL (ref 8.5–10.5)
CHLORIDE SERPL-SCNC: 110 MMOL/L (ref 96–112)
CO2 SERPL-SCNC: 22 MMOL/L (ref 20–33)
CREAT SERPL-MCNC: 0.87 MG/DL (ref 0.5–1.4)
GFR SERPLBLD CREATININE-BSD FMLA CKD-EPI: 96 ML/MIN/1.73 M 2
GLOBULIN SER CALC-MCNC: 3.3 G/DL (ref 1.9–3.5)
GLUCOSE SERPL-MCNC: 95 MG/DL (ref 65–99)
HAV IGM SERPL QL IA: NORMAL
HBV CORE IGM SER QL: NORMAL
HBV SURFACE AG SER QL: NORMAL
HCV AB SER QL: NORMAL
POTASSIUM SERPL-SCNC: 4.8 MMOL/L (ref 3.6–5.5)
PROT SERPL-MCNC: 7.3 G/DL (ref 6–8.2)
SODIUM SERPL-SCNC: 142 MMOL/L (ref 135–145)

## 2025-05-23 ENCOUNTER — TELEPHONE (OUTPATIENT)
Dept: INTERNAL MEDICINE | Facility: OTHER | Age: 65
End: 2025-05-23
Payer: OTHER GOVERNMENT

## 2025-05-23 DIAGNOSIS — R74.01 ELEVATED LIVER TRANSAMINASE LEVEL: Primary | ICD-10-CM

## 2025-06-03 NOTE — Clinical Note
REFERRAL APPROVAL NOTICE         Sent on Ada 3, 2025                   Carlos Alberto Gary  [address]  [address]  [place] [address]                   Dear Mr. Gary,    After a careful review of the medical information and benefit coverage, [facility] has processed your referral. See below for additional details.    If applicable, you must be actively enrolled with your insurance for coverage of the authorized service. If you have any questions regarding your coverage, please contact your insurance directly.    REFERRAL INFORMATION   Referral #:  [id number]  Referred-To Provider    Referred-By Provider:  Gastroenterology    Lilo Boles M.D.   [facility]      [address] [place] [id number] [phone] [address] [place] [id number] [phone]    Referral Start Date:  05/23/2025  Referral End Date:   05/23/2026             SCHEDULING  If you do not already have an appointment, please call [phone] to make an appointment.     MORE INFORMATION  If you do not already have a [employer] account, sign up at: [email or web address]  You can access your medical information, make appointments, see lab results, billing information, and more.  If you have questions regarding this referral, please contact  the [facility] Referrals department at:             [phone]. Monday - Friday 8:00AM - 5:00PM.     Sincerely,    [facility]